# Patient Record
Sex: FEMALE | Race: WHITE | NOT HISPANIC OR LATINO | Employment: UNEMPLOYED | URBAN - METROPOLITAN AREA
[De-identification: names, ages, dates, MRNs, and addresses within clinical notes are randomized per-mention and may not be internally consistent; named-entity substitution may affect disease eponyms.]

---

## 2017-02-21 ENCOUNTER — LAB (OUTPATIENT)
Dept: LAB | Facility: HOSPITAL | Age: 37
End: 2017-02-21

## 2017-02-21 ENCOUNTER — OFFICE VISIT (OUTPATIENT)
Dept: ONCOLOGY | Facility: CLINIC | Age: 37
End: 2017-02-21

## 2017-02-21 VITALS
RESPIRATION RATE: 16 BRPM | SYSTOLIC BLOOD PRESSURE: 142 MMHG | HEART RATE: 79 BPM | WEIGHT: 263 LBS | DIASTOLIC BLOOD PRESSURE: 101 MMHG | HEIGHT: 66 IN | BODY MASS INDEX: 42.27 KG/M2 | TEMPERATURE: 98.4 F

## 2017-02-21 DIAGNOSIS — D50.9 IRON DEFICIENCY ANEMIA: ICD-10-CM

## 2017-02-21 DIAGNOSIS — D50.9 IRON DEFICIENCY ANEMIA, UNSPECIFIED IRON DEFICIENCY ANEMIA TYPE: Primary | ICD-10-CM

## 2017-02-21 LAB
ERYTHROCYTE [DISTWIDTH] IN BLOOD BY AUTOMATED COUNT: 13.7 % (ref 11.3–14.5)
FERRITIN SERPL-MCNC: 6 NG/ML (ref 10–291)
HCT VFR BLD AUTO: 35.4 % (ref 34.5–44)
HGB BLD-MCNC: 11.5 G/DL (ref 11.5–15.5)
IRON 24H UR-MRATE: 47 MCG/DL (ref 50–175)
IRON SATN MFR SERPL: 12 % (ref 15–50)
LYMPHOCYTES # BLD AUTO: 1.7 10*3/MM3 (ref 0.6–4.8)
LYMPHOCYTES NFR BLD AUTO: 26.1 % (ref 24–44)
MCH RBC QN AUTO: 28.5 PG (ref 27–31)
MCHC RBC AUTO-ENTMCNC: 32.5 G/DL (ref 32–36)
MCV RBC AUTO: 87.6 FL (ref 80–99)
MONOCYTES # BLD AUTO: 0.4 10*3/MM3 (ref 0–1)
MONOCYTES NFR BLD AUTO: 6.3 % (ref 0–12)
NEUTROPHILS # BLD AUTO: 4.3 10*3/MM3 (ref 1.5–8.3)
NEUTROPHILS NFR BLD AUTO: 67.6 % (ref 41–71)
PLATELET # BLD AUTO: 297 10*3/MM3 (ref 150–450)
PMV BLD AUTO: 7.9 FL (ref 6–12)
RBC # BLD AUTO: 4.04 10*6/MM3 (ref 3.89–5.14)
TIBC SERPL-MCNC: 405 MCG/DL (ref 250–450)
WBC NRBC COR # BLD: 6.4 10*3/MM3 (ref 3.5–10.8)

## 2017-02-21 PROCEDURE — 85025 COMPLETE CBC W/AUTO DIFF WBC: CPT

## 2017-02-21 PROCEDURE — 82728 ASSAY OF FERRITIN: CPT | Performed by: INTERNAL MEDICINE

## 2017-02-21 PROCEDURE — 99213 OFFICE O/P EST LOW 20 MIN: CPT | Performed by: INTERNAL MEDICINE

## 2017-02-21 PROCEDURE — 83540 ASSAY OF IRON: CPT | Performed by: INTERNAL MEDICINE

## 2017-02-21 PROCEDURE — 83550 IRON BINDING TEST: CPT | Performed by: INTERNAL MEDICINE

## 2017-02-21 PROCEDURE — 36415 COLL VENOUS BLD VENIPUNCTURE: CPT

## 2017-02-21 NOTE — PROGRESS NOTES
"      PROBLEM LIST:  1. Iron deficiency anemia:   a) The patient has a several year history of menorrhagia. In 2014, she had a  colonoscopy and upper endoscopy which were unremarkable. She has required blood  transfusion on 2 separate occasions, in 04/2014 and 12/2015.   b) Feraheme infusion given in 02/2016, 6/2016, 8/2016.  2. Vitamin D deficiency.     Subjective     HISTORY OF PRESENT ILLNESS:   Anne Marie Nguyen returns for follow-up.   Since she was last here she has been feeling well.  Her menstrual bleeding has been regular - about 4 days of moderate bleeding.  Energy is good.  She has not been taking any iron supplements.    Past Medical History, Past Surgical History, Social History, Family History have been reviewed and are without significant changes except as mentioned.    Review of Systems   A comprehensive 14 point review of systems was performed and was negative except as mentioned.    Medications:  The current medication list was reviewed in the EMR    ALLERGIES:  No Known Allergies    Objective      Visit Vitals   • BP (!) 142/101   • Pulse 79   • Temp 98.4 °F (36.9 °C)   • Resp 16   • Ht 66\" (167.6 cm)   • Wt 263 lb (119 kg)   • BMI 42.45 kg/m2        Performance Status: 0    General: well appearing, in no acute distress  HEENT: sclera anicteric, oropharynx clear  Extremeties: no lower extremity edema  Skin: no rashes, lesions, bruising, or petechiae    RECENT LABS:  hgb is 11.5, stable from 11.1 in august. Iron studies pending.        Assessment/Plan   Anne Marie Nguyen is a 36-year-old female with iron deficiency anemia secondary to menorrhagia who is now s/p myomectomy with a significant decrease in her menstrual bleeding.  Her hemoglobin is stable at the lower end of the normal range after 6 months without any iron supplementation.   If her iron studies remain in the normal range today I do not think she will need further hematology follow up.  If her iron levels are declining, she may benefit from " once daily oral iron supplementation.  We will contact her by phone or by letter with the results of her iron studies.                Visit time was 15 minutes, greater than 50% spent in counseling      Kenya Quach MD  [facility]    2/21/2017          CC:

## 2017-02-23 ENCOUNTER — TELEPHONE (OUTPATIENT)
Dept: ONCOLOGY | Facility: CLINIC | Age: 37
End: 2017-02-23

## 2017-02-23 DIAGNOSIS — D50.9 IRON DEFICIENCY ANEMIA, UNSPECIFIED IRON DEFICIENCY ANEMIA TYPE: ICD-10-CM

## 2017-02-23 DIAGNOSIS — IMO0001 ADVERSE EFFECT OF IRON OR ITS COMPOUND, INITIAL ENCOUNTER: Primary | ICD-10-CM

## 2017-04-17 ENCOUNTER — OFFICE VISIT (OUTPATIENT)
Dept: INTERNAL MEDICINE | Facility: CLINIC | Age: 37
End: 2017-04-17

## 2017-04-17 VITALS
RESPIRATION RATE: 16 BRPM | BODY MASS INDEX: 43.09 KG/M2 | DIASTOLIC BLOOD PRESSURE: 92 MMHG | WEIGHT: 267 LBS | HEART RATE: 80 BPM | SYSTOLIC BLOOD PRESSURE: 132 MMHG

## 2017-04-17 DIAGNOSIS — Z00.00 HEALTH CARE MAINTENANCE: Primary | ICD-10-CM

## 2017-04-17 PROCEDURE — 86580 TB INTRADERMAL TEST: CPT | Performed by: INTERNAL MEDICINE

## 2017-04-17 PROCEDURE — 36415 COLL VENOUS BLD VENIPUNCTURE: CPT | Performed by: INTERNAL MEDICINE

## 2017-04-17 PROCEDURE — 90471 IMMUNIZATION ADMIN: CPT | Performed by: INTERNAL MEDICINE

## 2017-04-17 PROCEDURE — 99213 OFFICE O/P EST LOW 20 MIN: CPT | Performed by: INTERNAL MEDICINE

## 2017-04-17 PROCEDURE — 86481 TB AG RESPONSE T-CELL SUSP: CPT | Performed by: INTERNAL MEDICINE

## 2017-04-17 PROCEDURE — 90746 HEPB VACCINE 3 DOSE ADULT IM: CPT | Performed by: INTERNAL MEDICINE

## 2017-04-17 NOTE — PROGRESS NOTES
Subjective   Anne Marie Nguyen is a 37 y.o. female.   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             History of Present Illness   Pt has a history of iron deficiency anemia and Vit D deficiency. She is doing well with chronic issues. 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 She has no acute complaints.   - She needs titers for measles, mumps, rubella and varicella   - Needs Hep B vaccine   - Needs PPD   - She has had Tdap vaccine       The following portions of the patient's history were reviewed and updated as appropriate: allergies, current medications, past family history, past medical history, past social history, past surgical history and problem list.             Review of Systems   Constitutional: Negative.    HENT: Negative.    Eyes: Negative.    Respiratory: Negative.    Cardiovascular: Negative.    Gastrointestinal: Negative.    Endocrine: Negative.    Genitourinary: Negative.    Musculoskeletal: Negative.    Skin: Negative.    Allergic/Immunologic: Negative.    Neurological: Negative.    Hematological: Negative.    Psychiatric/Behavioral: Negative.             Objective   Physical Exam   Constitutional: She is oriented to person, place, and time. She appears well-developed and well-nourished.   HENT:   Head: Normocephalic and atraumatic.   Right Ear: External ear normal.   Left Ear: External ear normal.   Nose: Nose normal.   Mouth/Throat: Oropharynx is clear and moist.   Eyes: Conjunctivae and EOM are normal. Pupils are equal, round, and reactive to light.   Neck: Normal range of motion. Neck supple. No tracheal deviation present. No thyromegaly present.   Cardiovascular: Normal rate, regular rhythm, normal heart sounds and intact distal pulses.    Pulmonary/Chest: Effort normal and breath sounds normal.   Abdominal: Soft. Bowel sounds are normal. She exhibits no distension. There is no tenderness.   Neurological: She is alert and oriented to  person, place, and time.   Skin: Skin is warm and dry.   Psychiatric: She has a normal mood and affect.   Nursing note and vitals reviewed.            Assessment/Plan    Health care maintenance  -     Hepatitis B Vaccine Adult IM  -     Measles / Mumps / Rubella Immunity  -     Varicella Zoster Antibody, IgG  -     Varicella Zoster Antibody, IgM  -     TSPOT  -     TB Skin Test            1.) Order titers for measles, mumps,rubella,varicella  2.) Hep B received first vaccine today   3.) PPD

## 2017-04-18 LAB
MEV IGG SER IA-ACNC: <25 AU/ML
MUV IGG SER IA-ACNC: <9 AU/ML
RUBV IGG SERPL IA-ACNC: <0.9 INDEX
VZV IGG SER IA-ACNC: 508 INDEX
VZV IGM SER IA-ACNC: <0.91 INDEX (ref 0–0.9)

## 2017-04-20 LAB — REF LAB TEST METHOD: NORMAL

## 2017-04-21 ENCOUNTER — TELEPHONE (OUTPATIENT)
Dept: INTERNAL MEDICINE | Facility: CLINIC | Age: 37
End: 2017-04-21

## 2017-04-21 NOTE — TELEPHONE ENCOUNTER
Pt has been informed she would have to have it read here or the urgent care. Pt says she will call when she gets back in town to make another appt to have it redone.

## 2017-04-21 NOTE — TELEPHONE ENCOUNTER
----- Message from Melissa Kruger sent at 4/21/2017 10:55 AM EDT -----  Pt CALLING TO STATE THAT SHE HAD HER TB SKIN TEST READ BY PROFESSIONAL WITHIN ALLOTTED TIME FRAME. WANTS TO KNOW IF  ACCEPTABLE OR NEEDS TO RETAKE.      CONTACT [phone]

## 2017-04-21 NOTE — TELEPHONE ENCOUNTER
If the TB Skin test was given here, just she have to have it read by a [facility] facility in order to record the results or can we take her word for it?

## 2017-05-02 ENCOUNTER — TELEPHONE (OUTPATIENT)
Dept: INTERNAL MEDICINE | Facility: CLINIC | Age: 37
End: 2017-05-02

## 2017-05-08 ENCOUNTER — CLINICAL SUPPORT (OUTPATIENT)
Dept: INTERNAL MEDICINE | Facility: CLINIC | Age: 37
End: 2017-05-08

## 2017-05-08 DIAGNOSIS — Z23 NEED FOR MMR VACCINE: Primary | ICD-10-CM

## 2017-05-08 PROCEDURE — 90707 MMR VACCINE SC: CPT | Performed by: INTERNAL MEDICINE

## 2017-05-08 PROCEDURE — 90471 IMMUNIZATION ADMIN: CPT | Performed by: INTERNAL MEDICINE

## 2017-05-17 ENCOUNTER — CLINICAL SUPPORT (OUTPATIENT)
Dept: INTERNAL MEDICINE | Facility: CLINIC | Age: 37
End: 2017-05-17

## 2017-05-17 DIAGNOSIS — Z11.1 SCREENING EXAMINATION FOR PULMONARY TUBERCULOSIS: ICD-10-CM

## 2017-05-17 DIAGNOSIS — Z23 IMMUNIZATION DUE: Primary | ICD-10-CM

## 2017-05-17 PROCEDURE — 90471 IMMUNIZATION ADMIN: CPT | Performed by: INTERNAL MEDICINE

## 2017-05-17 PROCEDURE — 90744 HEPB VACC 3 DOSE PED/ADOL IM: CPT | Performed by: INTERNAL MEDICINE

## 2017-06-12 ENCOUNTER — CLINICAL SUPPORT (OUTPATIENT)
Dept: INTERNAL MEDICINE | Facility: CLINIC | Age: 37
End: 2017-06-12

## 2017-06-12 DIAGNOSIS — Z00.00 HEALTH CARE MAINTENANCE: Primary | ICD-10-CM

## 2017-06-12 PROCEDURE — 90707 MMR VACCINE SC: CPT | Performed by: INTERNAL MEDICINE

## 2017-06-12 PROCEDURE — 90471 IMMUNIZATION ADMIN: CPT | Performed by: INTERNAL MEDICINE

## 2017-06-12 PROCEDURE — 86580 TB INTRADERMAL TEST: CPT | Performed by: INTERNAL MEDICINE

## 2017-06-15 LAB
INDURATION: 0 MM (ref 0–10)
TB SKIN TEST: NEGATIVE

## 2017-07-06 ENCOUNTER — TELEPHONE (OUTPATIENT)
Dept: INTERNAL MEDICINE | Facility: CLINIC | Age: 37
End: 2017-07-06

## 2017-07-06 NOTE — TELEPHONE ENCOUNTER
I have no clue what the below message means. If there is a form I need to fill out, if someone would put it in my in box I will fill it out.

## 2017-07-06 NOTE — TELEPHONE ENCOUNTER
Regarding: RE: Test Results Question  Contact: [phone]  ----- Message from Raeann Quijano MA sent at 7/6/2017  1:50 PM EDT -----       ----- Message from Anne Marie Nguyen to Aracelis Jennings DO sent at 7/6/2017  1:02 PM -----   Yes, just the documentation of when the skin test was given and read and then signed by the physician. I have the form and have filled that part out.    Thank you,    Anne Marie  ----- Message -----  From: JONE DELGADO  Sent: 6/22/2017  2:23 PM EDT  To: Anne Marie Nguyen  Subject: RE: Test Results Question    WHAT FORM IS NEEDING TO BE FILLED OUT?   OR DO YOU JUST NEED THE DOCUMENTATION OF THE 2 TB SKIN TESTS THAT WERE PERFORMED?    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 I recently had a TB Skin test completed, but the paperwork provide was not correct. Can a form be filled out with the following information and faxed to [phone] or a I can pick it up.    Please resubmit with your Annual Risk Assessment form available for download from this requirement along with a Negative 2 step TB screening from the past 12 months Documentation must include the date given, the date read, and the reading in millimeters. It must also be signed by the clinician who performed and read the test. Negative IGRA (interferon gamma release assay) blood test from the past 12 months.    Thank you,    Anne Marie

## 2017-07-07 NOTE — TELEPHONE ENCOUNTER
LVM for pt to call back. Just need to inform her that her documentation is printed, up front , and ready for pickup. cw

## 2017-07-13 NOTE — TELEPHONE ENCOUNTER
LVM informing pt that paperwork was up front for . Advised if any questions or would like it mailed to call our office and let us know. Provided office number and hours.

## 2017-08-08 ENCOUNTER — TELEPHONE (OUTPATIENT)
Dept: INTERNAL MEDICINE | Facility: CLINIC | Age: 37
End: 2017-08-08

## 2017-08-08 DIAGNOSIS — Z11.1 TUBERCULOSIS SCREENING: Primary | ICD-10-CM

## 2017-08-08 NOTE — TELEPHONE ENCOUNTER
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  Dr. Jennings has put this in for her before.

## 2017-08-09 ENCOUNTER — LAB (OUTPATIENT)
Dept: INTERNAL MEDICINE | Facility: CLINIC | Age: 37
End: 2017-08-09

## 2017-08-09 DIAGNOSIS — Z11.1 TUBERCULOSIS SCREENING: ICD-10-CM

## 2017-08-09 PROCEDURE — 86481 TB AG RESPONSE T-CELL SUSP: CPT | Performed by: INTERNAL MEDICINE

## 2017-08-09 PROCEDURE — 36415 COLL VENOUS BLD VENIPUNCTURE: CPT | Performed by: INTERNAL MEDICINE

## 2017-08-14 LAB — REF LAB TEST METHOD: NORMAL

## 2017-10-17 ENCOUNTER — TELEPHONE (OUTPATIENT)
Dept: INTERNAL MEDICINE | Facility: CLINIC | Age: 37
End: 2017-10-17

## 2017-10-17 NOTE — TELEPHONE ENCOUNTER
----- Message from Kelsey العلي sent at 10/17/2017  3:13 PM EDT -----  Contact: SELF  NAHUM TYSON HAS AN APPT ON 10/19/17 AT 2:00 FOR HER 3RD HEP B VACCINE.

## 2017-10-19 ENCOUNTER — CLINICAL SUPPORT (OUTPATIENT)
Dept: INTERNAL MEDICINE | Facility: CLINIC | Age: 37
End: 2017-10-19

## 2017-10-19 DIAGNOSIS — Z23 NEED FOR HEPATITIS B VACCINATION: Primary | ICD-10-CM

## 2017-10-19 PROCEDURE — 90471 IMMUNIZATION ADMIN: CPT | Performed by: INTERNAL MEDICINE

## 2017-10-19 PROCEDURE — 90746 HEPB VACCINE 3 DOSE ADULT IM: CPT | Performed by: INTERNAL MEDICINE

## 2019-02-11 ENCOUNTER — OFFICE VISIT (OUTPATIENT)
Dept: INTERNAL MEDICINE | Facility: CLINIC | Age: 39
End: 2019-02-11

## 2019-02-11 VITALS
SYSTOLIC BLOOD PRESSURE: 142 MMHG | BODY MASS INDEX: 45.48 KG/M2 | WEIGHT: 273 LBS | HEIGHT: 65 IN | RESPIRATION RATE: 18 BRPM | TEMPERATURE: 98.8 F | HEART RATE: 84 BPM | DIASTOLIC BLOOD PRESSURE: 108 MMHG

## 2019-02-11 DIAGNOSIS — D64.9 ANEMIA, UNSPECIFIED TYPE: ICD-10-CM

## 2019-02-11 DIAGNOSIS — R32 URINARY INCONTINENCE, UNSPECIFIED TYPE: ICD-10-CM

## 2019-02-11 DIAGNOSIS — K21.9 GASTROESOPHAGEAL REFLUX DISEASE WITHOUT ESOPHAGITIS: ICD-10-CM

## 2019-02-11 DIAGNOSIS — Z12.31 ENCOUNTER FOR SCREENING MAMMOGRAM FOR BREAST CANCER: ICD-10-CM

## 2019-02-11 DIAGNOSIS — I10 ESSENTIAL HYPERTENSION: ICD-10-CM

## 2019-02-11 DIAGNOSIS — M54.42 ACUTE LEFT-SIDED LOW BACK PAIN WITH LEFT-SIDED SCIATICA: ICD-10-CM

## 2019-02-11 DIAGNOSIS — Z01.419 GYNECOLOGIC EXAM NORMAL: ICD-10-CM

## 2019-02-11 DIAGNOSIS — Z00.00 PHYSICAL EXAM: Primary | ICD-10-CM

## 2019-02-11 LAB
25(OH)D3 SERPL-MCNC: 18 NG/ML
ALBUMIN SERPL-MCNC: 4.42 G/DL (ref 3.2–4.8)
ALBUMIN/GLOB SERPL: 1.9 G/DL (ref 1.5–2.5)
ALP SERPL-CCNC: 72 U/L (ref 25–100)
ALT SERPL W P-5'-P-CCNC: 17 U/L (ref 7–40)
ANION GAP SERPL CALCULATED.3IONS-SCNC: 8 MMOL/L (ref 3–11)
ARTICHOKE IGE QN: 142 MG/DL (ref 0–130)
AST SERPL-CCNC: 27 U/L (ref 0–33)
BASOPHILS # BLD AUTO: 0.03 10*3/MM3 (ref 0–0.2)
BASOPHILS NFR BLD AUTO: 0.5 % (ref 0–1)
BILIRUB BLD-MCNC: NEGATIVE MG/DL
BILIRUB SERPL-MCNC: 0.4 MG/DL (ref 0.3–1.2)
BUN BLD-MCNC: 14 MG/DL (ref 9–23)
BUN/CREAT SERPL: 22.6 (ref 7–25)
CALCIUM SPEC-SCNC: 8.8 MG/DL (ref 8.7–10.4)
CHLORIDE SERPL-SCNC: 106 MMOL/L (ref 99–109)
CHOLEST SERPL-MCNC: 199 MG/DL (ref 0–200)
CLARITY, POC: CLEAR
CO2 SERPL-SCNC: 24 MMOL/L (ref 20–31)
COLOR UR: YELLOW
CREAT BLD-MCNC: 0.62 MG/DL (ref 0.6–1.3)
DEPRECATED RDW RBC AUTO: 42.9 FL (ref 37–54)
EOSINOPHIL # BLD AUTO: 0.13 10*3/MM3 (ref 0–0.3)
EOSINOPHIL NFR BLD AUTO: 2 % (ref 0–3)
ERYTHROCYTE [DISTWIDTH] IN BLOOD BY AUTOMATED COUNT: 13.1 % (ref 11.3–14.5)
EXPIRATION DATE: NORMAL
FERRITIN SERPL-MCNC: 9 NG/ML (ref 10–291)
GFR SERPL CREATININE-BSD FRML MDRD: 106 ML/MIN/1.73
GLOBULIN UR ELPH-MCNC: 2.4 GM/DL
GLUCOSE BLD-MCNC: 94 MG/DL (ref 70–100)
GLUCOSE UR STRIP-MCNC: NEGATIVE MG/DL
HCT VFR BLD AUTO: 38.6 % (ref 34.5–44)
HDLC SERPL-MCNC: 45 MG/DL (ref 40–60)
HGB BLD-MCNC: 12.2 G/DL (ref 11.5–15.5)
IMM GRANULOCYTES # BLD AUTO: 0.01 10*3/MM3 (ref 0–0.05)
IMM GRANULOCYTES NFR BLD AUTO: 0.2 % (ref 0–0.6)
IRON 24H UR-MRATE: 69 MCG/DL (ref 50–175)
IRON SATN MFR SERPL: 17 % (ref 15–50)
KETONES UR QL: NEGATIVE
LEUKOCYTE EST, POC: NEGATIVE
LYMPHOCYTES # BLD AUTO: 1.95 10*3/MM3 (ref 0.6–4.8)
LYMPHOCYTES NFR BLD AUTO: 29.6 % (ref 24–44)
Lab: NORMAL
MCH RBC QN AUTO: 28.8 PG (ref 27–31)
MCHC RBC AUTO-ENTMCNC: 31.6 G/DL (ref 32–36)
MCV RBC AUTO: 91 FL (ref 80–99)
MONOCYTES # BLD AUTO: 0.54 10*3/MM3 (ref 0–1)
MONOCYTES NFR BLD AUTO: 8.2 % (ref 0–12)
NEUTROPHILS # BLD AUTO: 3.94 10*3/MM3 (ref 1.5–8.3)
NEUTROPHILS NFR BLD AUTO: 59.7 % (ref 41–71)
NITRITE UR-MCNC: NEGATIVE MG/ML
PH UR: 6 [PH] (ref 5–8)
PLATELET # BLD AUTO: 332 10*3/MM3 (ref 150–450)
PMV BLD AUTO: 11.5 FL (ref 6–12)
POTASSIUM BLD-SCNC: 4.1 MMOL/L (ref 3.5–5.5)
PROT SERPL-MCNC: 6.8 G/DL (ref 5.7–8.2)
PROT UR STRIP-MCNC: NEGATIVE MG/DL
RBC # BLD AUTO: 4.24 10*6/MM3 (ref 3.89–5.14)
RBC # UR STRIP: NEGATIVE /UL
SODIUM BLD-SCNC: 138 MMOL/L (ref 132–146)
SP GR UR: 1.01 (ref 1–1.03)
TIBC SERPL-MCNC: 399 MCG/DL (ref 250–450)
TRIGL SERPL-MCNC: 105 MG/DL (ref 0–150)
TSH SERPL DL<=0.05 MIU/L-ACNC: 0.97 MIU/ML (ref 0.35–5.35)
UROBILINOGEN UR QL: NORMAL
WBC NRBC COR # BLD: 6.59 10*3/MM3 (ref 3.5–10.8)

## 2019-02-11 PROCEDURE — 83550 IRON BINDING TEST: CPT | Performed by: INTERNAL MEDICINE

## 2019-02-11 PROCEDURE — 83540 ASSAY OF IRON: CPT | Performed by: INTERNAL MEDICINE

## 2019-02-11 PROCEDURE — 82306 VITAMIN D 25 HYDROXY: CPT | Performed by: INTERNAL MEDICINE

## 2019-02-11 PROCEDURE — 99386 PREV VISIT NEW AGE 40-64: CPT | Performed by: INTERNAL MEDICINE

## 2019-02-11 PROCEDURE — 80061 LIPID PANEL: CPT | Performed by: INTERNAL MEDICINE

## 2019-02-11 PROCEDURE — 36415 COLL VENOUS BLD VENIPUNCTURE: CPT | Performed by: INTERNAL MEDICINE

## 2019-02-11 PROCEDURE — 99213 OFFICE O/P EST LOW 20 MIN: CPT | Performed by: INTERNAL MEDICINE

## 2019-02-11 PROCEDURE — 81003 URINALYSIS AUTO W/O SCOPE: CPT | Performed by: INTERNAL MEDICINE

## 2019-02-11 PROCEDURE — 85025 COMPLETE CBC W/AUTO DIFF WBC: CPT | Performed by: INTERNAL MEDICINE

## 2019-02-11 PROCEDURE — 82728 ASSAY OF FERRITIN: CPT | Performed by: INTERNAL MEDICINE

## 2019-02-11 PROCEDURE — 84443 ASSAY THYROID STIM HORMONE: CPT | Performed by: INTERNAL MEDICINE

## 2019-02-11 PROCEDURE — 80053 COMPREHEN METABOLIC PANEL: CPT | Performed by: INTERNAL MEDICINE

## 2019-02-11 RX ORDER — OMEPRAZOLE 40 MG/1
40 CAPSULE, DELAYED RELEASE ORAL DAILY
COMMUNITY
End: 2019-10-28

## 2019-02-11 RX ORDER — CYCLOBENZAPRINE HCL 10 MG
10 TABLET ORAL 3 TIMES DAILY PRN
Qty: 30 TABLET | Refills: 1 | Status: SHIPPED | OUTPATIENT
Start: 2019-02-11 | End: 2019-08-01 | Stop reason: SDUPTHER

## 2019-02-11 NOTE — PROGRESS NOTES
Chief Complaint   Patient presents with   • NEW PATIENT, ESTABLISH CARE   • LOW BACK AND LEFT HIP PAIN       History of Present Illness    The patient presents for an established patient physical examination and health maintenance visit.    The patient presents for a yearly gynecologic exam. She is G 0 P 0 Ab 0 SpAb 0 LC 0. Her menses are regular. She is sexually active. She has never had a sexually transmitted disease. She denies vaginal discharge and she denies dyspareunia, dysmenorrhea or menorrhagia. The patient has not had prior abnormal pap smears. The most recent pap smear was in 2016 and revealed no abnormalities. There has not been a past history of HPV or condylomata.       She has not had a prior mammogram.    The patient presents for a follow-up related to anemia. There are no reports of blood loss. The patient has no symptoms of a dry cough, a wet cough, wheezing, fever, a headache, myalgias, sweats, weight loss, decreased appetite, chills, fatigue or paresthesias. The patient's energy level is normal. There are no reports of chest pain, shortness of breath, palpitations or syncope. She had a fibroid removed and her menses are now regular and lighter.    The patient presents for an acute visit for hypertension. The patient reports that she has had no edema or blurred vision. She states that she does not take medication.    The patient presents for a follow-up related to GERD. The patient is on omeprazole for her gastroesophageal reflux. The medication is taken on a regular basis and gives complete relief of the symptoms. She reports no belching, dysphagia, early satiety, hoarseness or odynophagia. The GERD has no known aggravating factors.    The patient presents for a follow-up related to low back pain which began ten days ago. She states that the pain was first noted to come on insidiously. The pain is not associated with rashes or a decreased urine stream. The patient denies a past history of  malignancy. The pain radiates to the left hip. There is no numbness. The patient denies loss of bladder control. The patient denies loss of bowel control. The patient has attempted no therapy to relieve the symptoms. The patient reports bending aggravates the symptoms.    The patient complains of urinary incontinence of a duration of longer than one year. The urine stream is normal. The patient denies increased nocturia. She states that she dribbles and she denies a sensation of incomplete emptying of the bladder. The urinary incontinence has no know precipitants. The patient denies urgency. She denies a history of a history of back injuries or back pain. She does not have a history of cerebrovascular accidents, dementia, diabetes or chronic UTI's. The patient is not on anticholinergics, antidepressants, antipsychotics, diuretics, hypontics, opiates, or ace inhibitors. She denies excessive caffeine intake and drinks minimal alcohol.    Review of Systems    CONSTITUTIONAL- Denies Weakness or Malaise.    NECK- Denies Decreased Range of Motion, Stiffness, Thyroid Nodules, Enlarged Lymph Nodes or Goiter.    EYES- Denies Eye Pain, Eye Drainage, Eye Redness, Eye Discharge, Decreased Vision, Visual Disturbance, Diplopia, Visual Loss or Swollen Eyelid.    HENT- Denies Nasal Discharge, Sore Throat, Ear Pain, Ear Drainage, Sinus Pain, Nasal Congestion, Decreased Hearing or Tinnitus.    GASTROINTESTINAL- Denies Abdominal Pain, Nausea, Vomiting, Diarrhea, Blood per Rectum, Constipation or Heartburn.    PULMONARY- Denies Sputum Production, Cough, Hemoptysis or Pleuritic Chest Pain.    CARDIOVASCULAR- Denies Claudication or Irregular Heart Beat.    GENITOURINARY- Reports: Urinary Leakage. Denies: Dysuria, Hematuria, Urinary Frequency, Pelvic Pain or Vaginal Prolapse.    ENDOCRINE- Denies Cold Intolerance, Depression, Hair Loss, Heat Intolerance, Memory Loss, Polydypsia, Polyphagia, Polyuria, Sleep Disturbance or Weight  "Gain.    NEUROLOGIC- Denies Seizures, Confusion or Excessive Daytime Sleepiness.    MUSCULOSKELETAL- Denies Joint Pain, Joint Stiffness, Decreased Range of Motion, Joint Swelling or Erythema of Joints.    INTEGUMENTARY- Denies Lumps, Sores, Itching, Dryness, Color Change, Changes in Hair, Brittle Nails, Discolored Nails, Thickened Nails, Growths or Alopecia.    Medications      Current Outpatient Medications:   •  MULTIPLE VITAMIN PO, Take  by mouth daily., Disp: , Rfl:   •  omeprazole (priLOSEC) 40 MG capsule, Take 40 mg by mouth Daily., Disp: , Rfl:   •  cyclobenzaprine (FLEXERIL) 10 MG tablet, Take 1 tablet by mouth 3 (Three) Times a Day As Needed for Muscle Spasms., Disp: 30 tablet, Rfl: 1     Allergies    No Known Allergies    Problem List    Patient Active Problem List   Diagnosis   • Iron deficiency anemia   • Adverse effect of iron or its compound   • Vitamin D deficiency   • Health care maintenance       Medications, Allergies, Problems List and Past History were reviewed and updated.    Physical Examination    BP (!) 142/108 (BP Location: Left arm, Patient Position: Sitting, Cuff Size: Adult)   Pulse 84   Temp 98.8 °F (37.1 °C) (Temporal)   Resp 18   Ht 163.8 cm (64.5\")   Wt 124 kg (273 lb)   LMP 01/16/2019 (Exact Date)   Breastfeeding? No   BMI 46.14 kg/m²     HEENT: Head- Normocephalic Atraumatic. Facies- Within normal limits. Pinnas- Normal texture and shape bilaterally. Canals- Normal bilaterally. TMs- Normal bilaterally. Nares- Patent bilaterally. Nasal Septum- is normal. There is no tenderness to palpation over the frontal or maxillary sinuses. Lids- Normal bilaterally. Conjunctiva- Clear bilaterally. Sclera- Anicteric bilaterally. Oropharynx- Moist with no lesions. Tonsils- No enlargement, erythema or exudate.    Neck: Thyroid- non enlarged, symmetric and has no nodules. No bruits are detected. ROM- Normal Range of Motion with no rigidity.    Lungs: Auscultation- Clear to auscultation " bilaterally. There are no retractions, clubbing or cyanosis. The Expiratory to Inspiratory ratio is equal.    Lymph Nodes: Cervical- no enlarged lymph nodes noted. Clavicular- no enlarged supraclavicular lymph nodes noted. Axillary- no enlarged axillary lymph nodes noted. Inguinal- no enlarged inguinal lymph nodes noted.    Cardiovascular: There are no carotid bruits. Heart- Normal Rate with Regular rhythm and no murmurs. There are no gallops. There are no rubs. In the lower extremities there is no edema. The upper extremities do not have edema.    Abdomen: Soft, benign, non-tender with no masses, hernias, organomegaly or scars.    Breast: Normal contours bilaterally with no masses, discharge, skin changes or lumps. There are no scars noted.      Genitourinary: The labia are within normal limits with no lesions. The vaginal introitus is within normal limits. The vagina has a healthy mucosa with no lesions or discharge. The cervix is multiparous with no lesions. The bimanual examination reveals no adnexal masses or tenderness.    Back: The patient has a normal spinal curvature with no evidence of scoliosis. There are no skin lesions noted on the back. Palpation reveals no tenderness and normal muscle tone. The straight leg raising test is negative. The back has normal flexion, extension, rotation, and lateral bending.    Lower Extremity Neuro Exam: Muscle Strength is 5/5 in all major lower extremity muscle groups. Deep Tendon Reflexes are 2+ and equal in the patellar and Achilles distributions bilaterally. Sensation is normal in all distributions.    Hips: The left hip has normal sudheer landmarks and no noted atrophy. Left hip flexion is normal. Left hip extension is normal. Left hip abduction is normal. There is no pain with forced abduction. Left hip adduction is normal. Left hip internal rotation is painful and restricted. Left hip external rotation is painful and restricted. There is no pain with straight leg  raising on the left.    Dermatologic: The patient has no worrisome or suspicious skin lesions noted.    Impression and Assessment    Normal Physical Examination.    Normal Gynecologic Exam.    Encounter for Screening Mammogram for Malignant Neoplasm of the Breast.    Anemia.    Essential Hypertension.    Gastroesophageal Reflux Disease.    Low Back Pain.    Urinary Incontinence.    Plan    Gastroesophageal Reflux Disease Plan: The current plan was continued.    Essential Hypertension Plan: Instructions were given to eat a low salt diet. She was encouraged to exercise daily. Weight loss was encouraged. Recheck in one month.    Urinary Incontinence Plan: Kegel exercises were explained and encouraged.    Low Back Pain Plan: Medication will be added as noted.  Physical Therapy was scheduled.    Normal Gynecologic Exam Plan: A Pap smear was obtained.    Anemia Plan: Further plans will be made after results of tests are available.    Counseling was provided regarding: Adequate Aerobic Exercise, Dental Visits, Flossing Teeth, Heart Healthy Diet and Weight Lose.    The following was ordered for screening and health maintenance: Lipid Profile, Screening Mammogram, TSH and Vitamin D Level.       Immunizations Ordered and Administered: None.    Anne Marie was seen today for new patient, establish care and low back and left hip pain.    Diagnoses and all orders for this visit:    Physical exam  -     Lipid Panel  -     TSH  -     Vitamin D 25 Hydroxy    Gynecologic exam normal  -     Liquid-based Pap Smear, Screening; Future    Anemia, unspecified type  -     Comprehensive Metabolic Panel  -     CBC & Differential  -     Iron Profile  -     Ferritin  -     CBC Auto Differential    Essential hypertension  -     Comprehensive Metabolic Panel  -     POC Urinalysis Dipstick, Automated    Gastroesophageal reflux disease without esophagitis  -     Comprehensive Metabolic Panel    Acute left-sided low back pain with left-sided sciatica  -      Ambulatory Referral to Physical Therapy Evaluate and treat  -     cyclobenzaprine (FLEXERIL) 10 MG tablet; Take 1 tablet by mouth 3 (Three) Times a Day As Needed for Muscle Spasms.    Urinary incontinence, unspecified type  -     POC Urinalysis Dipstick, Automated    Encounter for screening mammogram for breast cancer  -     Mammo Screening Digital Tomosynthesis Bilateral With CAD; Future        Return to Office    The patient was instructed to return for follow-up in 1 month. Next Visit: Follow-up.    The patient was instructed to return sooner if the condition changes, worsens, or does not resolve.

## 2019-03-20 ENCOUNTER — OFFICE VISIT (OUTPATIENT)
Dept: INTERNAL MEDICINE | Facility: CLINIC | Age: 39
End: 2019-03-20

## 2019-03-20 VITALS
SYSTOLIC BLOOD PRESSURE: 126 MMHG | BODY MASS INDEX: 45.97 KG/M2 | TEMPERATURE: 98.9 F | WEIGHT: 272 LBS | HEART RATE: 100 BPM | RESPIRATION RATE: 18 BRPM | DIASTOLIC BLOOD PRESSURE: 78 MMHG

## 2019-03-20 DIAGNOSIS — M54.50 ACUTE MIDLINE LOW BACK PAIN WITHOUT SCIATICA: ICD-10-CM

## 2019-03-20 DIAGNOSIS — I10 ESSENTIAL HYPERTENSION: Primary | ICD-10-CM

## 2019-03-20 PROCEDURE — 99213 OFFICE O/P EST LOW 20 MIN: CPT | Performed by: INTERNAL MEDICINE

## 2019-03-20 NOTE — PROGRESS NOTES
Chief Complaint   Patient presents with   • Follow-up     1 MONTH FOLLOW UP        History of Present Illness    The patient presents for a follow-up related to hypertension. The patient reports that she has had no headaches, chest pain, dyspnea, edema, syncope, blurred vision or palpitations. She states that she is taking her medication as prescribed. She is not having medication side effects.    The patient presents for follow-up of low back pain. She reports that the pain is resolved. The treatment consisted of physical therapy.   She does not have associated fever, chills, weight loss, rashes, dysuria, hematuria, or a decreased urine stream.    Review of Systems    CONSTITUTIONAL- Denies Unexplained Weight Loss, Fever, Chills, Sweats, Fatigue, Weakness or Malaise.    PULMONARY- Denies Wheezing, Sputum Production, Cough, Hemoptysis or Pleuritic Chest Pain.    CARDIOVASCULAR- Denies Claudication or Irregular Heart Beat.    Medications      Current Outpatient Medications:   •  calcium carb-cholecalciferol (CALCIUM 600+D3) 600-800 MG-UNIT tablet, Take 1 tablet by mouth Daily., Disp: , Rfl:   •  cyclobenzaprine (FLEXERIL) 10 MG tablet, Take 1 tablet by mouth 3 (Three) Times a Day As Needed for Muscle Spasms., Disp: 30 tablet, Rfl: 1  •  omeprazole (priLOSEC) 40 MG capsule, Take 40 mg by mouth Daily., Disp: , Rfl:      Allergies    No Known Allergies    Problem List    Patient Active Problem List   Diagnosis   • Iron deficiency anemia   • Adverse effect of iron or its compound   • Vitamin D deficiency   • Health care maintenance       Medications, Allergies, Problems List and Past History were reviewed and updated.    Physical Examination    /78   Pulse 100   Temp 98.9 °F (37.2 °C) (Temporal)   Resp 18   Wt 123 kg (272 lb)   LMP 03/17/2019 (Exact Date)   Breastfeeding? No   BMI 45.97 kg/m²     Neck: Thyroid- non enlarged, symmetric and has no nodules. No bruits are detected.    Lungs: Auscultation- Clear  to auscultation bilaterally. There are no retractions, clubbing or cyanosis. The Expiratory to Inspiratory ratio is equal.    Cardiovascular: Heart- Normal Rate with Regular rhythm and no murmurs.    Back: The patient has a normal spinal curvature with no evidence of scoliosis. There are no skin lesions noted on the back. Palpation reveals no tenderness and normal muscle tone. The straight leg raising test is negative. The back has normal flexion, extension, rotation, and lateral bending.    Impression and Assessment    Essential Hypertension.    Low Back Pain.    Plan    Essential Hypertension Plan: The condition is stable. No change was made in the current plan.    Low Back Pain Plan: The condition is stable. No change is needed in the current plan.      Return to Office    The patient was instructed to return for follow-up in 6 months.    The patient was instructed to return sooner if the condition changes, worsens, or does not resolve.

## 2019-03-27 ENCOUNTER — HOSPITAL ENCOUNTER (OUTPATIENT)
Dept: MAMMOGRAPHY | Facility: HOSPITAL | Age: 39
Discharge: HOME | End: 2019-03-27
Admitting: INTERNAL MEDICINE

## 2019-03-27 DIAGNOSIS — Z12.31 ENCOUNTER FOR SCREENING MAMMOGRAM FOR BREAST CANCER: ICD-10-CM

## 2019-03-27 PROCEDURE — 77067 SCR MAMMO BI INCL CAD: CPT | Performed by: RADIOLOGY

## 2019-03-27 PROCEDURE — 77067 SCR MAMMO BI INCL CAD: CPT

## 2019-03-27 PROCEDURE — 77063 BREAST TOMOSYNTHESIS BI: CPT

## 2019-03-27 PROCEDURE — 77063 BREAST TOMOSYNTHESIS BI: CPT | Performed by: RADIOLOGY

## 2019-08-01 ENCOUNTER — OFFICE VISIT (OUTPATIENT)
Dept: NEUROSURGERY | Facility: CLINIC | Age: 39
End: 2019-08-01

## 2019-08-01 ENCOUNTER — TELEPHONE (OUTPATIENT)
Dept: NEUROSURGERY | Facility: CLINIC | Age: 39
End: 2019-08-01

## 2019-08-01 VITALS
SYSTOLIC BLOOD PRESSURE: 122 MMHG | TEMPERATURE: 97.9 F | HEIGHT: 65 IN | DIASTOLIC BLOOD PRESSURE: 74 MMHG | WEIGHT: 264 LBS | BODY MASS INDEX: 43.99 KG/M2

## 2019-08-01 DIAGNOSIS — M54.42 ACUTE MIDLINE LOW BACK PAIN WITH LEFT-SIDED SCIATICA: ICD-10-CM

## 2019-08-01 DIAGNOSIS — M54.42 ACUTE LOW BACK PAIN WITH LEFT-SIDED SCIATICA, UNSPECIFIED BACK PAIN LATERALITY: Primary | ICD-10-CM

## 2019-08-01 DIAGNOSIS — M54.42 ACUTE LEFT-SIDED LOW BACK PAIN WITH LEFT-SIDED SCIATICA: ICD-10-CM

## 2019-08-01 PROCEDURE — 99203 OFFICE O/P NEW LOW 30 MIN: CPT | Performed by: PHYSICIAN ASSISTANT

## 2019-08-01 RX ORDER — CYCLOBENZAPRINE HCL 10 MG
10 TABLET ORAL 3 TIMES DAILY PRN
Qty: 60 TABLET | Refills: 1 | Status: SHIPPED | OUTPATIENT
Start: 2019-08-01 | End: 2019-10-04 | Stop reason: SDUPTHER

## 2019-08-01 RX ORDER — NABUMETONE 750 MG/1
750 TABLET, FILM COATED ORAL 2 TIMES DAILY PRN
Qty: 60 TABLET | Refills: 0 | Status: SHIPPED | OUTPATIENT
Start: 2019-08-01 | End: 2019-10-04 | Stop reason: SDUPTHER

## 2019-08-01 NOTE — PROGRESS NOTES
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  She admits to a history of back pain which has waxed and waned for years for which she never sought intervention.  She states that the pain worsened approximately 2 months ago.  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  She states that the pain is currently isolated to the lower back/left posterior hip area.  She states that the pain is associated with numbness which occurs intermittently and extends to her left calf and sometimes foot.  She denies weakness of the leg.  She is ambulatory without issue although has occasional difficulty secondary to pain.  She denies bowel or bladder dysfunction or numbness in the sacral distribution.  She has been through extensive physical therapy since March of this year.  Additionally, she is taken steroids, muscle relaxers, anti-inflammatories, gabapentin.  All of these conservative measures have given her minimal relief.  She has recently started using a TENS unit around her left piriformis region which has given her the most relief.      Past Medical and Surgical History:  Past Medical History:   Diagnosis Date   • Anemia     iron deficiency   • Iron deficiency anemia due to chronic blood loss    • Uterine fibroid    • Vitamin D deficiency      Past Surgical History:   Procedure Laterality Date   • COLONOSCOPY     • MOUTH SURGERY      wisdom teeth   • MYOMECTOMY VAGINAL APPROACH N/A 2016   • THYROID SURGERY     • WISDOM TOOTH EXTRACTION         Current Medications:    Current Outpatient Medications:   •  calcium carb-cholecalciferol  "(CALCIUM 600+D3) 600-800 MG-UNIT tablet, Take 1 tablet by mouth Daily., Disp: , Rfl:   •  cyclobenzaprine (FLEXERIL) 10 MG tablet, Take 1 tablet by mouth 3 (Three) Times a Day As Needed for Muscle Spasms., Disp: 60 tablet, Rfl: 1  •  nabumetone (RELAFEN) 750 MG tablet, Take 1 tablet by mouth 2 (Two) Times a Day As Needed for Mild Pain ., Disp: 60 tablet, Rfl: 0  •  omeprazole (priLOSEC) 40 MG capsule, Take 40 mg by mouth Daily., Disp: , Rfl:     Allergies:  No Known Allergies    Review of Systems:  Review of Systems   Constitutional: Negative for diaphoresis, fatigue and fever.   Respiratory: Negative for shortness of breath.    Cardiovascular: Negative for chest pain and palpitations.   Gastrointestinal: Negative for abdominal pain, nausea and vomiting.   Genitourinary: Positive for urinary incontinence.   Musculoskeletal: Negative for back pain and neck pain.   Neurological: Negative for dizziness, syncope, weakness, light-headedness and confusion.   All other systems reviewed and are negative.        Physical Examination:  Vitals:    08/01/19 0921   BP: 122/74   Temp: 97.9 °F (36.6 °C)   Weight: 120 kg (264 lb)   Height: 163.8 cm (64.5\")       Physical Exam   Constitutional: She is oriented to person, place, and time. She appears well-developed and well-nourished.   HENT:   Head: Normocephalic and atraumatic.   Neck: Normal range of motion. Neck supple.   Neurological: She is alert and oriented to person, place, and time. She has normal strength. She displays no atrophy.   Reflex Scores:       Patellar reflexes are 1+ on the right side and 1+ on the left side.       Achilles reflexes are 1+ on the right side and 1+ on the left side.  SLR positive at 45 degrees on the left.  SLR negative on the right  Gait is normal.  Able to perform standing on toes and heels   Skin: Skin is warm and dry.         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  She is only had lumbar x-rays to date.  Her back pain and left-sided sciatica symptoms have been treated with conservative therapy for months to no avail.  I have ordered a lumbar MRI without contrast for further evaluation.  Patient is concerned with pain management as she has been unable to work.  I have put in a referral to Dr. Shane at this time.  I will see her back in 1 week with MRI.  I advised her to continue conservative therapy including ice or heat to her posterior left hip area, anti-inflammatories and muscle relaxers as needed.  She will contact her office if she were to develop worsening symptoms in the meantime.    Follow Up:  1 week    Shannon Kay PA-C

## 2019-08-08 ENCOUNTER — OFFICE VISIT (OUTPATIENT)
Dept: NEUROSURGERY | Facility: CLINIC | Age: 39
End: 2019-08-08

## 2019-08-08 ENCOUNTER — HOSPITAL ENCOUNTER (OUTPATIENT)
Dept: MRI IMAGING | Facility: HOSPITAL | Age: 39
Discharge: HOME | End: 2019-08-08
Admitting: PHYSICIAN ASSISTANT

## 2019-08-08 VITALS
DIASTOLIC BLOOD PRESSURE: 86 MMHG | HEIGHT: 65 IN | WEIGHT: 265 LBS | BODY MASS INDEX: 44.15 KG/M2 | TEMPERATURE: 98 F | SYSTOLIC BLOOD PRESSURE: 128 MMHG

## 2019-08-08 DIAGNOSIS — M54.42 ACUTE MIDLINE LOW BACK PAIN WITH LEFT-SIDED SCIATICA: Primary | ICD-10-CM

## 2019-08-08 DIAGNOSIS — M54.42 ACUTE LOW BACK PAIN WITH LEFT-SIDED SCIATICA, UNSPECIFIED BACK PAIN LATERALITY: ICD-10-CM

## 2019-08-08 PROCEDURE — 72148 MRI LUMBAR SPINE W/O DYE: CPT

## 2019-08-08 PROCEDURE — 99213 OFFICE O/P EST LOW 20 MIN: CPT | Performed by: PHYSICIAN ASSISTANT

## 2019-08-08 RX ORDER — GABAPENTIN 300 MG/1
300 CAPSULE ORAL 3 TIMES DAILY
COMMUNITY
End: 2019-10-28

## 2019-08-08 NOTE — PROGRESS NOTES
Patient: Anne Marie May  : 1980  Gender: female    Primary Care Provider: Nic Greer MD    Chief Complaint:   Chief Complaint   Patient presents with   • Back Pain       History of Present Illness:  Ms. May is a 39-year-old female who presents today for follow-up of back pain.  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  This pain radiates to her left hip and is associated with numbness which extends to her left calf.  She is exhausted several conservative options including physical therapy and medications.  She presents today with a new lumbar MRI.      Past Medical and Surgical History:  Past Medical History:   Diagnosis Date   • Anemia     iron deficiency   • Iron deficiency anemia due to chronic blood loss    • Uterine fibroid    • Vitamin D deficiency      Past Surgical History:   Procedure Laterality Date   • COLONOSCOPY     • MOUTH SURGERY      wisdom teeth   • MYOMECTOMY VAGINAL APPROACH N/A 2016   • THYROID SURGERY     • WISDOM TOOTH EXTRACTION         Current Medications:    Current Outpatient Medications:   •  calcium carb-cholecalciferol (CALCIUM 600+D3) 600-800 MG-UNIT tablet, Take 1 tablet by mouth Daily., Disp: , Rfl:   •  cyclobenzaprine (FLEXERIL) 10 MG tablet, Take 1 tablet by mouth 3 (Three) Times a Day As Needed for Muscle Spasms., Disp: 60 tablet, Rfl: 1  •  gabapentin (NEURONTIN) 300 MG capsule, Take 300 mg by mouth 3 (Three) Times a Day., Disp: , Rfl:   •  nabumetone (RELAFEN) 750 MG tablet, Take 1 tablet by mouth 2 (Two) Times a Day As Needed for Mild Pain ., Disp: 60 tablet, Rfl: 0  •  omeprazole (priLOSEC) 40 MG capsule, Take 40 mg by mouth Daily., Disp: , Rfl:     Allergies:  No Known Allergies      Review of Systems   Constitutional: Negative for activity change, appetite change, chills, diaphoresis, fatigue, fever, unexpected weight gain and unexpected  weight loss.   HENT: Negative for congestion, dental problem, drooling, ear discharge, ear pain, facial swelling, hearing loss, mouth sores, nosebleeds, postnasal drip, rhinorrhea, sinus pressure, sneezing, sore throat, tinnitus, trouble swallowing and voice change.    Eyes: Negative for blurred vision, double vision, photophobia, pain, discharge, redness, itching and visual disturbance.   Respiratory: Negative for apnea, cough, choking, chest tightness, shortness of breath, wheezing and stridor.    Cardiovascular: Negative for chest pain, palpitations and leg swelling.   Gastrointestinal: Negative for abdominal distention, abdominal pain, anal bleeding, blood in stool, constipation, diarrhea, nausea, rectal pain, vomiting, GERD and indigestion.   Endocrine: Negative for cold intolerance, heat intolerance, polydipsia, polyphagia and polyuria.   Genitourinary: Positive for urinary incontinence. Negative for breast discharge, decreased libido, decreased urine volume, difficulty urinating, dysuria, flank pain, frequency, genital sores, hematuria and urgency.   Musculoskeletal: Positive for back pain and myalgias. Negative for arthralgias, gait problem, joint swelling, neck pain, neck stiffness and bursitis.   Skin: Negative for color change, dry skin, pallor, rash, skin lesions and bruise.   Allergic/Immunologic: Negative for environmental allergies, food allergies and immunocompromised state.   Neurological: Negative for dizziness, tremors, seizures, syncope, facial asymmetry, speech difficulty, weakness, light-headedness, numbness, headache, memory problem and confusion.   Hematological: Negative for adenopathy. Does not bruise/bleed easily.   Psychiatric/Behavioral: Negative for agitation, behavioral problems, decreased concentration, dysphoric mood, hallucinations, self-injury, sleep disturbance, suicidal ideas, negative for hyperactivity, depressed mood and stress. The patient is not nervous/anxious.    All other  "systems reviewed and are negative.        Physical Exam  Constitutional: She is oriented to person, place, and time. She appears well-developed and well-nourished.   HENT:   Head: Normocephalic and atraumatic.   Neck: Normal range of motion. Neck supple.   Neurological: She is alert and oriented to person, place, and time. She has normal strength. She displays no atrophy.         Vitals:    08/08/19 1304   BP: 128/86   BP Location: Left arm   Patient Position: Sitting   Temp: 98 °F (36.7 °C)   TempSrc: Temporal   Weight: 120 kg (265 lb)   Height: 163.8 cm (64.5\")         Imaging Review:  Lumbar MRI (8/8/2019): Central disc protrusion at the L4/L5 level creating mass  effect on the nerve roots and compromise of the left nerve root. No  significant neuroforaminal narrowing. Moderate to severe central spinal  canal stenosis    Assessment:  Lumbar disc herniation    Plan:  Ms. May is seen today in follow-up. Lumbar MRI was reviewed and reveals a broad-based disc bulge at L4-5, most prominent on the left comprising the exiting left nerve root. Patient is anxious to return to work and would like to proceed with a full conservative course. We will arrange a referral for JONATHAN at the earliest date that we can. She would also like to try chiropractic and/or massage therapy.   I will review patient's diagnostic imaging with Dr. Bright and call patient with additional recommendations.  We will base Ms. May's follow up upon PM referral and progress after this. She will contact our office with any concerns in the meantime.         Shannon Kay PA-C  "

## 2019-08-12 ENCOUNTER — TELEPHONE (OUTPATIENT)
Dept: NEUROSURGERY | Facility: CLINIC | Age: 39
End: 2019-08-12

## 2019-08-12 NOTE — TELEPHONE ENCOUNTER
Attempted to call patient to discuss MRI findings which were reviewed with Dr. Bright.  We will continue with conservative measures if patient wishes to do so.  Asked that patient return our call with an update on her symptoms.

## 2019-10-03 ENCOUNTER — OFFICE VISIT (OUTPATIENT)
Dept: NEUROSURGERY | Facility: CLINIC | Age: 39
End: 2019-10-03

## 2019-10-03 VITALS — WEIGHT: 265 LBS | BODY MASS INDEX: 44.15 KG/M2 | HEIGHT: 65 IN

## 2019-10-03 DIAGNOSIS — M51.26 HERNIATION OF INTERVERTEBRAL DISC OF LUMBAR SPINE: Primary | ICD-10-CM

## 2019-10-03 DIAGNOSIS — M51.36 DEGENERATIVE LUMBAR DISC: ICD-10-CM

## 2019-10-03 PROCEDURE — 99213 OFFICE O/P EST LOW 20 MIN: CPT | Performed by: NEUROLOGICAL SURGERY

## 2019-10-03 RX ORDER — IBUPROFEN 800 MG/1
800 TABLET ORAL EVERY 6 HOURS PRN
COMMUNITY
End: 2019-12-02

## 2019-10-03 RX ORDER — TRAMADOL HYDROCHLORIDE 50 MG/1
50 TABLET ORAL EVERY 6 HOURS PRN
COMMUNITY
End: 2019-11-19

## 2019-10-03 NOTE — PROGRESS NOTES
Subjective   Anne Marie May is a 39 y.o. female who presents for follow up of left hip and leg pain.     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  Her MRI scan lumbar spine was done on 8/8/2019 and showed a very large central and left paracentral disc herniation at L4-5 compressing the left L5 nerve root.  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  She came to the office urgently today because of a level of pain that exceeded that she had experienced before.    The following portions of the patient's history were reviewed, updated as appropriate and approved: allergies, current medications, past family history, past medical history, past social history, past surgical history, review of systems and problem list.     Review of Systems   Constitutional: Negative for activity change, appetite change, chills, diaphoresis, fatigue, fever and unexpected weight change.   HENT: Negative for congestion, dental problem, drooling, ear discharge, ear pain, facial swelling, hearing loss, mouth sores, nosebleeds, postnasal drip, rhinorrhea, sinus pressure, sneezing, sore throat, tinnitus, trouble swallowing and voice change.    Eyes: Negative for photophobia, pain, discharge, redness, itching and visual disturbance.   Respiratory: Negative for apnea, cough, choking, chest tightness, shortness of breath, wheezing and stridor.    Cardiovascular: Negative for chest pain, palpitations and leg swelling.   Gastrointestinal: Negative for abdominal distention, abdominal pain, anal bleeding, blood in stool, constipation, diarrhea, nausea, rectal pain and vomiting.   Endocrine: Negative for cold intolerance,  heat intolerance, polydipsia, polyphagia and polyuria.   Genitourinary: Negative for decreased urine volume, difficulty urinating, dysuria, enuresis, flank pain, frequency, genital sores, hematuria and urgency.   Musculoskeletal: Positive for back pain and myalgias. Negative for arthralgias, gait problem, joint swelling, neck pain and neck stiffness.   Skin: Negative for color change, pallor, rash and wound.   Allergic/Immunologic: Negative for environmental allergies, food allergies and immunocompromised state.   Neurological: Negative for dizziness, tremors, seizures, syncope, facial asymmetry, speech difficulty, weakness, light-headedness, numbness and headaches.   Hematological: Negative for adenopathy. Does not bruise/bleed easily.   Psychiatric/Behavioral: Negative for agitation, behavioral problems, confusion, decreased concentration, dysphoric mood, hallucinations, self-injury, sleep disturbance and suicidal ideas. The patient is not nervous/anxious and is not hyperactive.        Objective    NEUROLOGICAL EXAMINATION:    SLR +20 degrees left.    Assessment   Herniated disc L4-5 left paracentral, left L5 radiculopathy, failure to improve after 4 months of concerted conservative management.       Plan   Recommend minimal access laminotomy discectomy L4-5 left.       Jeff Bright MD

## 2019-10-04 DIAGNOSIS — M54.42 ACUTE LEFT-SIDED LOW BACK PAIN WITH LEFT-SIDED SCIATICA: ICD-10-CM

## 2019-10-04 PROBLEM — M51.36 DEGENERATIVE LUMBAR DISC: Status: ACTIVE | Noted: 2019-10-04

## 2019-10-04 PROBLEM — M51.26 HERNIATION OF INTERVERTEBRAL DISC OF LUMBAR SPINE: Status: ACTIVE | Noted: 2019-10-04

## 2019-10-04 PROBLEM — M51.369 DEGENERATIVE LUMBAR DISC: Status: ACTIVE | Noted: 2019-10-04

## 2019-10-04 RX ORDER — CYCLOBENZAPRINE HCL 10 MG
10 TABLET ORAL 3 TIMES DAILY PRN
Qty: 60 TABLET | Refills: 1 | Status: SHIPPED | OUTPATIENT
Start: 2019-10-04 | End: 2019-11-19

## 2019-10-04 RX ORDER — NABUMETONE 750 MG/1
750 TABLET, FILM COATED ORAL 2 TIMES DAILY PRN
Qty: 60 TABLET | Refills: 0 | Status: SHIPPED | OUTPATIENT
Start: 2019-10-04 | End: 2019-10-28

## 2019-10-04 NOTE — TELEPHONE ENCOUNTER
Provider:  Deangelo  Caller: Anne Marie     Phone #:  [phone]  Surgery:  Lumbar lami/Disc  Surgery Date:  Upcoming 11/6/19  Last visit:     Next visit:     DEV:         Reason for call:           Flexeril, nabumetone rf

## 2019-10-07 ENCOUNTER — PREP FOR SURGERY (OUTPATIENT)
Dept: OTHER | Facility: HOSPITAL | Age: 39
End: 2019-10-07

## 2019-10-07 DIAGNOSIS — M51.26 HERNIATION OF INTERVERTEBRAL DISC OF LUMBAR SPINE: Primary | ICD-10-CM

## 2019-10-07 RX ORDER — CHLORHEXIDINE GLUCONATE 4 G/100ML
SOLUTION TOPICAL
Qty: 120 ML | Refills: 0 | Status: SHIPPED | OUTPATIENT
Start: 2019-10-07 | End: 2019-11-19

## 2019-10-07 RX ORDER — HYDROCODONE BITARTRATE AND ACETAMINOPHEN 7.5; 325 MG/1; MG/1
1 TABLET ORAL ONCE
Status: CANCELLED | OUTPATIENT
Start: 2019-10-07 | End: 2019-10-07

## 2019-10-07 RX ORDER — SODIUM CHLORIDE AND POTASSIUM CHLORIDE 150; 900 MG/100ML; MG/100ML
50 INJECTION, SOLUTION INTRAVENOUS CONTINUOUS
Status: CANCELLED | OUTPATIENT
Start: 2019-10-07

## 2019-10-07 RX ORDER — IBUPROFEN 800 MG/1
800 TABLET ORAL ONCE
Status: CANCELLED | OUTPATIENT
Start: 2019-10-07 | End: 2019-10-07

## 2019-10-07 RX ORDER — SODIUM CHLORIDE 0.9 % (FLUSH) 0.9 %
3 SYRINGE (ML) INJECTION EVERY 12 HOURS SCHEDULED
Status: CANCELLED | OUTPATIENT
Start: 2019-10-07

## 2019-10-07 RX ORDER — ACETAMINOPHEN 325 MG/1
650 TABLET ORAL ONCE
Status: CANCELLED | OUTPATIENT
Start: 2019-10-07 | End: 2019-10-07

## 2019-10-07 RX ORDER — CEFAZOLIN SODIUM 2 G/100ML
2 INJECTION, SOLUTION INTRAVENOUS ONCE
Status: CANCELLED | OUTPATIENT
Start: 2019-10-07 | End: 2019-10-07

## 2019-10-21 ENCOUNTER — TELEPHONE (OUTPATIENT)
Dept: NEUROSURGERY | Facility: CLINIC | Age: 39
End: 2019-10-21

## 2019-10-21 DIAGNOSIS — M51.26 HERNIATION OF INTERVERTEBRAL DISC OF LUMBAR SPINE: Primary | ICD-10-CM

## 2019-10-21 NOTE — TELEPHONE ENCOUNTER
Provider:  Deangelo  Caller: patient  Time of call:   10:39  Phone #:  [phone]  Surgery:  Upcoming Lumbar discectomy, laminectomy  Surgery Date:  11/06/19  Last visit:   10/03/19  Next visit: surgery    DEV:         Reason for call:     Patient called and said she is scheduled for surgery, however she states her symptoms on the left side have subsided.  She is now having symptoms on the right side.  She wants to know if Dr. Bright wants to get a new MRI since her pain has deferred itself?

## 2019-10-21 NOTE — TELEPHONE ENCOUNTER
Please ask if symptoms are as severe on the right side.  I will have to discuss this with Dr. Bright when he returns later this week.

## 2019-10-21 NOTE — TELEPHONE ENCOUNTER
She said they are not as severe on the right side, but it is bad. The RLE and calf are causing enough discomfort that it wakes her at night when she turns over.    The left side is 1/10 th of what it was before.  Not so much a issue.

## 2019-10-28 ENCOUNTER — APPOINTMENT (OUTPATIENT)
Dept: PREADMISSION TESTING | Facility: HOSPITAL | Age: 39
End: 2019-10-28

## 2019-10-28 ENCOUNTER — HOSPITAL ENCOUNTER (OUTPATIENT)
Dept: MRI IMAGING | Facility: HOSPITAL | Age: 39
Discharge: HOME | End: 2019-10-28
Admitting: PHYSICIAN ASSISTANT

## 2019-10-28 VITALS — WEIGHT: 266.54 LBS | HEIGHT: 66 IN | BODY MASS INDEX: 42.84 KG/M2

## 2019-10-28 DIAGNOSIS — M51.26 HERNIATION OF INTERVERTEBRAL DISC OF LUMBAR SPINE: ICD-10-CM

## 2019-10-28 LAB
ANION GAP SERPL CALCULATED.3IONS-SCNC: 12 MMOL/L (ref 5–15)
B-HCG UR QL: NEGATIVE
BUN BLD-MCNC: 9 MG/DL (ref 6–20)
BUN/CREAT SERPL: 14.8 (ref 7–25)
CALCIUM SPEC-SCNC: 9.4 MG/DL (ref 8.6–10.5)
CHLORIDE SERPL-SCNC: 101 MMOL/L (ref 98–107)
CO2 SERPL-SCNC: 26 MMOL/L (ref 22–29)
CREAT BLD-MCNC: 0.61 MG/DL (ref 0.57–1)
DEPRECATED RDW RBC AUTO: 43.4 FL (ref 37–54)
ERYTHROCYTE [DISTWIDTH] IN BLOOD BY AUTOMATED COUNT: 13.7 % (ref 12.3–15.4)
GFR SERPL CREATININE-BSD FRML MDRD: 108 ML/MIN/1.73
GLUCOSE BLD-MCNC: 102 MG/DL (ref 65–99)
HCT VFR BLD AUTO: 37.4 % (ref 34–46.6)
HGB BLD-MCNC: 11.4 G/DL (ref 12–15.9)
MCH RBC QN AUTO: 26.6 PG (ref 26.6–33)
MCHC RBC AUTO-ENTMCNC: 30.5 G/DL (ref 31.5–35.7)
MCV RBC AUTO: 87.2 FL (ref 79–97)
PLATELET # BLD AUTO: 423 10*3/MM3 (ref 140–450)
PMV BLD AUTO: 9.6 FL (ref 6–12)
POTASSIUM BLD-SCNC: 4 MMOL/L (ref 3.5–5.2)
RBC # BLD AUTO: 4.29 10*6/MM3 (ref 3.77–5.28)
SODIUM BLD-SCNC: 139 MMOL/L (ref 136–145)
WBC NRBC COR # BLD: 7.15 10*3/MM3 (ref 3.4–10.8)

## 2019-10-28 PROCEDURE — 72148 MRI LUMBAR SPINE W/O DYE: CPT

## 2019-10-28 PROCEDURE — 81025 URINE PREGNANCY TEST: CPT | Performed by: NEUROLOGICAL SURGERY

## 2019-10-28 PROCEDURE — 87081 CULTURE SCREEN ONLY: CPT | Performed by: ANESTHESIOLOGY

## 2019-10-28 PROCEDURE — 85027 COMPLETE CBC AUTOMATED: CPT | Performed by: NEUROLOGICAL SURGERY

## 2019-10-28 PROCEDURE — 80048 BASIC METABOLIC PNL TOTAL CA: CPT | Performed by: NEUROLOGICAL SURGERY

## 2019-10-28 PROCEDURE — 36415 COLL VENOUS BLD VENIPUNCTURE: CPT

## 2019-10-29 LAB — MRSA SPEC QL CULT: NORMAL

## 2019-11-05 ENCOUNTER — ANESTHESIA EVENT (OUTPATIENT)
Dept: PERIOP | Facility: HOSPITAL | Age: 39
End: 2019-11-05

## 2019-11-05 RX ORDER — FAMOTIDINE 10 MG/ML
20 INJECTION, SOLUTION INTRAVENOUS ONCE
Status: CANCELLED | OUTPATIENT
Start: 2019-11-05 | End: 2019-11-05

## 2019-11-06 ENCOUNTER — APPOINTMENT (OUTPATIENT)
Dept: GENERAL RADIOLOGY | Facility: HOSPITAL | Age: 39
End: 2019-11-06

## 2019-11-06 ENCOUNTER — HOSPITAL ENCOUNTER (OUTPATIENT)
Facility: HOSPITAL | Age: 39
Setting detail: HOSPITAL OUTPATIENT SURGERY
Discharge: HOME | End: 2019-11-06
Attending: NEUROLOGICAL SURGERY | Admitting: NEUROLOGICAL SURGERY

## 2019-11-06 ENCOUNTER — ANESTHESIA (OUTPATIENT)
Dept: PERIOP | Facility: HOSPITAL | Age: 39
End: 2019-11-06

## 2019-11-06 VITALS
TEMPERATURE: 98.6 F | HEART RATE: 94 BPM | BODY MASS INDEX: 42.75 KG/M2 | SYSTOLIC BLOOD PRESSURE: 149 MMHG | DIASTOLIC BLOOD PRESSURE: 104 MMHG | WEIGHT: 266 LBS | RESPIRATION RATE: 20 BRPM | HEIGHT: 66 IN | OXYGEN SATURATION: 98 %

## 2019-11-06 LAB
B-HCG UR QL: NEGATIVE
INTERNAL NEGATIVE CONTROL: NEGATIVE
INTERNAL POSITIVE CONTROL: POSITIVE
Lab: NORMAL

## 2019-11-06 PROCEDURE — 63030 LAMOT DCMPRN NRV RT 1 LMBR: CPT | Performed by: NEUROLOGICAL SURGERY

## 2019-11-06 PROCEDURE — 25010000002 PROPOFOL 10 MG/ML EMULSION: Performed by: NURSE ANESTHETIST, CERTIFIED REGISTERED

## 2019-11-06 PROCEDURE — 25010000002 ONDANSETRON PER 1 MG: Performed by: NURSE ANESTHETIST, CERTIFIED REGISTERED

## 2019-11-06 PROCEDURE — 25010000003 CEFAZOLIN IN DEXTROSE 2-4 GM/100ML-% SOLUTION: Performed by: NEUROLOGICAL SURGERY

## 2019-11-06 PROCEDURE — 25010000002 MIDAZOLAM PER 1 MG: Performed by: NEUROLOGICAL SURGERY

## 2019-11-06 PROCEDURE — 81025 URINE PREGNANCY TEST: CPT | Performed by: ANESTHESIOLOGY

## 2019-11-06 PROCEDURE — 76000 FLUOROSCOPY <1 HR PHYS/QHP: CPT

## 2019-11-06 PROCEDURE — 25010000002 NEOSTIGMINE 10 MG/10ML SOLUTION: Performed by: NURSE ANESTHETIST, CERTIFIED REGISTERED

## 2019-11-06 PROCEDURE — S0260 H&P FOR SURGERY: HCPCS | Performed by: NEUROLOGICAL SURGERY

## 2019-11-06 PROCEDURE — 25010000002 FENTANYL CITRATE (PF) 100 MCG/2ML SOLUTION: Performed by: NURSE ANESTHETIST, CERTIFIED REGISTERED

## 2019-11-06 PROCEDURE — 25010000002 METHYLPREDNISOLONE PER 40 MG: Performed by: NEUROLOGICAL SURGERY

## 2019-11-06 PROCEDURE — 25010000002 DEXAMETHASONE PER 1 MG: Performed by: NURSE ANESTHETIST, CERTIFIED REGISTERED

## 2019-11-06 RX ORDER — MIDAZOLAM HYDROCHLORIDE 1 MG/ML
2 INJECTION INTRAMUSCULAR; INTRAVENOUS
Status: COMPLETED | OUTPATIENT
Start: 2019-11-06 | End: 2019-11-06

## 2019-11-06 RX ORDER — HYDROCODONE BITARTRATE AND ACETAMINOPHEN 5; 325 MG/1; MG/1
1 TABLET ORAL EVERY 6 HOURS PRN
Qty: 30 TABLET | Refills: 0 | Status: SHIPPED | OUTPATIENT
Start: 2019-11-06 | End: 2019-11-19

## 2019-11-06 RX ORDER — HYDROCODONE BITARTRATE AND ACETAMINOPHEN 5; 325 MG/1; MG/1
1 TABLET ORAL ONCE AS NEEDED
Status: DISCONTINUED | OUTPATIENT
Start: 2019-11-06 | End: 2019-11-07 | Stop reason: HOSPADM

## 2019-11-06 RX ORDER — PROMETHAZINE HYDROCHLORIDE 25 MG/ML
6.25 INJECTION, SOLUTION INTRAMUSCULAR; INTRAVENOUS ONCE AS NEEDED
Status: DISCONTINUED | OUTPATIENT
Start: 2019-11-06 | End: 2019-11-07 | Stop reason: HOSPADM

## 2019-11-06 RX ORDER — HYDROCODONE BITARTRATE AND ACETAMINOPHEN 7.5; 325 MG/1; MG/1
1 TABLET ORAL ONCE
Status: COMPLETED | OUTPATIENT
Start: 2019-11-06 | End: 2019-11-06

## 2019-11-06 RX ORDER — NEOSTIGMINE METHYLSULFATE 1 MG/ML
INJECTION, SOLUTION INTRAVENOUS AS NEEDED
Status: DISCONTINUED | OUTPATIENT
Start: 2019-11-06 | End: 2019-11-06 | Stop reason: SURG

## 2019-11-06 RX ORDER — FAMOTIDINE 20 MG/1
20 TABLET, FILM COATED ORAL ONCE
Status: COMPLETED | OUTPATIENT
Start: 2019-11-06 | End: 2019-11-06

## 2019-11-06 RX ORDER — CEFAZOLIN SODIUM 2 G/100ML
2 INJECTION, SOLUTION INTRAVENOUS ONCE
Status: COMPLETED | OUTPATIENT
Start: 2019-11-06 | End: 2019-11-06

## 2019-11-06 RX ORDER — IPRATROPIUM BROMIDE AND ALBUTEROL SULFATE 2.5; .5 MG/3ML; MG/3ML
3 SOLUTION RESPIRATORY (INHALATION) ONCE AS NEEDED
Status: DISCONTINUED | OUTPATIENT
Start: 2019-11-06 | End: 2019-11-07 | Stop reason: HOSPADM

## 2019-11-06 RX ORDER — ACETAMINOPHEN 325 MG/1
650 TABLET ORAL ONCE
Status: COMPLETED | OUTPATIENT
Start: 2019-11-06 | End: 2019-11-06

## 2019-11-06 RX ORDER — SODIUM CHLORIDE 0.9 % (FLUSH) 0.9 %
3 SYRINGE (ML) INJECTION EVERY 12 HOURS SCHEDULED
Status: DISCONTINUED | OUTPATIENT
Start: 2019-11-06 | End: 2019-11-07 | Stop reason: HOSPADM

## 2019-11-06 RX ORDER — NALOXONE HCL 0.4 MG/ML
0.4 VIAL (ML) INJECTION AS NEEDED
Status: DISCONTINUED | OUTPATIENT
Start: 2019-11-06 | End: 2019-11-07 | Stop reason: HOSPADM

## 2019-11-06 RX ORDER — HYDROMORPHONE HYDROCHLORIDE 1 MG/ML
0.5 INJECTION, SOLUTION INTRAMUSCULAR; INTRAVENOUS; SUBCUTANEOUS
Status: DISCONTINUED | OUTPATIENT
Start: 2019-11-06 | End: 2019-11-07 | Stop reason: HOSPADM

## 2019-11-06 RX ORDER — DEXAMETHASONE SODIUM PHOSPHATE 4 MG/ML
INJECTION, SOLUTION INTRA-ARTICULAR; INTRALESIONAL; INTRAMUSCULAR; INTRAVENOUS; SOFT TISSUE AS NEEDED
Status: DISCONTINUED | OUTPATIENT
Start: 2019-11-06 | End: 2019-11-06 | Stop reason: SURG

## 2019-11-06 RX ORDER — LIDOCAINE HYDROCHLORIDE 10 MG/ML
0.5 INJECTION, SOLUTION EPIDURAL; INFILTRATION; INTRACAUDAL; PERINEURAL ONCE AS NEEDED
Status: COMPLETED | OUTPATIENT
Start: 2019-11-06 | End: 2019-11-06

## 2019-11-06 RX ORDER — ONDANSETRON 2 MG/ML
4 INJECTION INTRAMUSCULAR; INTRAVENOUS ONCE AS NEEDED
Status: DISCONTINUED | OUTPATIENT
Start: 2019-11-06 | End: 2019-11-07 | Stop reason: HOSPADM

## 2019-11-06 RX ORDER — GLYCOPYRROLATE 0.2 MG/ML
INJECTION INTRAMUSCULAR; INTRAVENOUS AS NEEDED
Status: DISCONTINUED | OUTPATIENT
Start: 2019-11-06 | End: 2019-11-06 | Stop reason: SURG

## 2019-11-06 RX ORDER — FENTANYL CITRATE 50 UG/ML
50 INJECTION, SOLUTION INTRAMUSCULAR; INTRAVENOUS
Status: DISCONTINUED | OUTPATIENT
Start: 2019-11-06 | End: 2019-11-07 | Stop reason: HOSPADM

## 2019-11-06 RX ORDER — SODIUM CHLORIDE 0.9 % (FLUSH) 0.9 %
3 SYRINGE (ML) INJECTION EVERY 12 HOURS SCHEDULED
Status: DISCONTINUED | OUTPATIENT
Start: 2019-11-06 | End: 2019-11-06 | Stop reason: HOSPADM

## 2019-11-06 RX ORDER — LIDOCAINE HYDROCHLORIDE 10 MG/ML
INJECTION, SOLUTION EPIDURAL; INFILTRATION; INTRACAUDAL; PERINEURAL AS NEEDED
Status: DISCONTINUED | OUTPATIENT
Start: 2019-11-06 | End: 2019-11-06 | Stop reason: SURG

## 2019-11-06 RX ORDER — MAGNESIUM HYDROXIDE 1200 MG/15ML
LIQUID ORAL AS NEEDED
Status: DISCONTINUED | OUTPATIENT
Start: 2019-11-06 | End: 2019-11-06 | Stop reason: HOSPADM

## 2019-11-06 RX ORDER — PROPOFOL 10 MG/ML
VIAL (ML) INTRAVENOUS AS NEEDED
Status: DISCONTINUED | OUTPATIENT
Start: 2019-11-06 | End: 2019-11-06 | Stop reason: SURG

## 2019-11-06 RX ORDER — LABETALOL HYDROCHLORIDE 5 MG/ML
5 INJECTION, SOLUTION INTRAVENOUS
Status: DISCONTINUED | OUTPATIENT
Start: 2019-11-06 | End: 2019-11-07 | Stop reason: HOSPADM

## 2019-11-06 RX ORDER — MEPERIDINE HYDROCHLORIDE 25 MG/ML
12.5 INJECTION INTRAMUSCULAR; INTRAVENOUS; SUBCUTANEOUS
Status: DISCONTINUED | OUTPATIENT
Start: 2019-11-06 | End: 2019-11-07 | Stop reason: HOSPADM

## 2019-11-06 RX ORDER — SODIUM CHLORIDE 0.9 % (FLUSH) 0.9 %
3-10 SYRINGE (ML) INJECTION AS NEEDED
Status: DISCONTINUED | OUTPATIENT
Start: 2019-11-06 | End: 2019-11-06 | Stop reason: HOSPADM

## 2019-11-06 RX ORDER — BUPIVACAINE HYDROCHLORIDE AND EPINEPHRINE 2.5; 5 MG/ML; UG/ML
INJECTION, SOLUTION EPIDURAL; INFILTRATION; INTRACAUDAL; PERINEURAL AS NEEDED
Status: DISCONTINUED | OUTPATIENT
Start: 2019-11-06 | End: 2019-11-06 | Stop reason: HOSPADM

## 2019-11-06 RX ORDER — METHYLPREDNISOLONE ACETATE 40 MG/ML
INJECTION, SUSPENSION INTRA-ARTICULAR; INTRALESIONAL; INTRAMUSCULAR; SOFT TISSUE AS NEEDED
Status: DISCONTINUED | OUTPATIENT
Start: 2019-11-06 | End: 2019-11-06 | Stop reason: HOSPADM

## 2019-11-06 RX ORDER — SODIUM CHLORIDE 0.9 % (FLUSH) 0.9 %
3-10 SYRINGE (ML) INJECTION AS NEEDED
Status: DISCONTINUED | OUTPATIENT
Start: 2019-11-06 | End: 2019-11-07 | Stop reason: HOSPADM

## 2019-11-06 RX ORDER — LABETALOL HYDROCHLORIDE 5 MG/ML
INJECTION, SOLUTION INTRAVENOUS AS NEEDED
Status: DISCONTINUED | OUTPATIENT
Start: 2019-11-06 | End: 2019-11-06 | Stop reason: SURG

## 2019-11-06 RX ORDER — FENTANYL CITRATE 50 UG/ML
INJECTION, SOLUTION INTRAMUSCULAR; INTRAVENOUS AS NEEDED
Status: DISCONTINUED | OUTPATIENT
Start: 2019-11-06 | End: 2019-11-06 | Stop reason: SURG

## 2019-11-06 RX ORDER — IBUPROFEN 800 MG/1
800 TABLET ORAL ONCE
Status: COMPLETED | OUTPATIENT
Start: 2019-11-06 | End: 2019-11-06

## 2019-11-06 RX ORDER — SODIUM CHLORIDE, SODIUM LACTATE, POTASSIUM CHLORIDE, CALCIUM CHLORIDE 600; 310; 30; 20 MG/100ML; MG/100ML; MG/100ML; MG/100ML
9 INJECTION, SOLUTION INTRAVENOUS CONTINUOUS
Status: DISCONTINUED | OUTPATIENT
Start: 2019-11-06 | End: 2019-11-07 | Stop reason: HOSPADM

## 2019-11-06 RX ORDER — DOCUSATE SODIUM 100 MG/1
100 CAPSULE, LIQUID FILLED ORAL 2 TIMES DAILY PRN
COMMUNITY
End: 2022-11-16

## 2019-11-06 RX ORDER — ONDANSETRON 2 MG/ML
INJECTION INTRAMUSCULAR; INTRAVENOUS AS NEEDED
Status: DISCONTINUED | OUTPATIENT
Start: 2019-11-06 | End: 2019-11-06 | Stop reason: SURG

## 2019-11-06 RX ORDER — HYDRALAZINE HYDROCHLORIDE 20 MG/ML
5 INJECTION INTRAMUSCULAR; INTRAVENOUS
Status: DISCONTINUED | OUTPATIENT
Start: 2019-11-06 | End: 2019-11-07 | Stop reason: HOSPADM

## 2019-11-06 RX ORDER — PROMETHAZINE HYDROCHLORIDE 25 MG/1
25 TABLET ORAL ONCE AS NEEDED
Status: DISCONTINUED | OUTPATIENT
Start: 2019-11-06 | End: 2019-11-07 | Stop reason: HOSPADM

## 2019-11-06 RX ORDER — ROCURONIUM BROMIDE 10 MG/ML
INJECTION, SOLUTION INTRAVENOUS AS NEEDED
Status: DISCONTINUED | OUTPATIENT
Start: 2019-11-06 | End: 2019-11-06 | Stop reason: SURG

## 2019-11-06 RX ORDER — PROMETHAZINE HYDROCHLORIDE 25 MG/1
25 SUPPOSITORY RECTAL ONCE AS NEEDED
Status: DISCONTINUED | OUTPATIENT
Start: 2019-11-06 | End: 2019-11-07 | Stop reason: HOSPADM

## 2019-11-06 RX ADMIN — CEFAZOLIN SODIUM 2 G: 2 INJECTION, SOLUTION INTRAVENOUS at 12:18

## 2019-11-06 RX ADMIN — LIDOCAINE HYDROCHLORIDE 0.5 ML: 10 INJECTION, SOLUTION EPIDURAL; INFILTRATION; INTRACAUDAL; PERINEURAL at 10:46

## 2019-11-06 RX ADMIN — ONDANSETRON 4 MG: 2 INJECTION INTRAMUSCULAR; INTRAVENOUS at 13:40

## 2019-11-06 RX ADMIN — IBUPROFEN 800 MG: 800 TABLET ORAL at 10:46

## 2019-11-06 RX ADMIN — LABETALOL HYDROCHLORIDE 5 MG: 5 INJECTION, SOLUTION INTRAVENOUS at 13:13

## 2019-11-06 RX ADMIN — MIDAZOLAM 1 MG: 1 INJECTION INTRAMUSCULAR; INTRAVENOUS at 12:25

## 2019-11-06 RX ADMIN — PROPOFOL 250 MG: 10 INJECTION, EMULSION INTRAVENOUS at 12:25

## 2019-11-06 RX ADMIN — NEOSTIGMINE METHYLSULFATE 5 MG: 1 INJECTION, SOLUTION INTRAVENOUS at 13:40

## 2019-11-06 RX ADMIN — GLYCOPYRROLATE 0.8 MG: 0.2 INJECTION, SOLUTION INTRAMUSCULAR; INTRAVENOUS at 13:40

## 2019-11-06 RX ADMIN — LIDOCAINE HYDROCHLORIDE 50 MG: 10 INJECTION, SOLUTION EPIDURAL; INFILTRATION; INTRACAUDAL; PERINEURAL at 12:25

## 2019-11-06 RX ADMIN — MIDAZOLAM 2 MG: 1 INJECTION INTRAMUSCULAR; INTRAVENOUS at 10:57

## 2019-11-06 RX ADMIN — HYDROCODONE BITARTRATE AND ACETAMINOPHEN 1 TABLET: 7.5; 325 TABLET ORAL at 10:46

## 2019-11-06 RX ADMIN — ACETAMINOPHEN 650 MG: 325 TABLET ORAL at 10:46

## 2019-11-06 RX ADMIN — ROCURONIUM BROMIDE 10 MG: 10 INJECTION INTRAVENOUS at 13:15

## 2019-11-06 RX ADMIN — FENTANYL CITRATE 50 MCG: 50 INJECTION, SOLUTION INTRAMUSCULAR; INTRAVENOUS at 12:51

## 2019-11-06 RX ADMIN — MIDAZOLAM 1 MG: 1 INJECTION INTRAMUSCULAR; INTRAVENOUS at 12:18

## 2019-11-06 RX ADMIN — ROCURONIUM BROMIDE 30 MG: 10 INJECTION INTRAVENOUS at 12:25

## 2019-11-06 RX ADMIN — HYDROCODONE BITARTRATE AND ACETAMINOPHEN 1 TABLET: 5; 325 TABLET ORAL at 14:30

## 2019-11-06 RX ADMIN — DEXAMETHASONE SODIUM PHOSPHATE 8 MG: 4 INJECTION, SOLUTION INTRAMUSCULAR; INTRAVENOUS at 12:43

## 2019-11-06 RX ADMIN — PROPOFOL 100 MG: 10 INJECTION, EMULSION INTRAVENOUS at 13:15

## 2019-11-06 RX ADMIN — FAMOTIDINE 20 MG: 20 TABLET ORAL at 10:46

## 2019-11-06 RX ADMIN — FENTANYL CITRATE 50 MCG: 50 INJECTION, SOLUTION INTRAMUSCULAR; INTRAVENOUS at 12:25

## 2019-11-06 RX ADMIN — SODIUM CHLORIDE, POTASSIUM CHLORIDE, SODIUM LACTATE AND CALCIUM CHLORIDE 9 ML/HR: 600; 310; 30; 20 INJECTION, SOLUTION INTRAVENOUS at 10:46

## 2019-11-06 NOTE — ANESTHESIA POSTPROCEDURE EVALUATION
Patient: Anne Marie May    Procedure Summary     Date:  11/06/19 Room / Location:   [facility]    Anesthesia Start:  1218 Anesthesia Stop:  1410    Procedure:  LUMBAR DISCECTOMY LAMINOTOMY  L4-5 LEFT (Left Spine Lumbar) Diagnosis:       Herniation of intervertebral disc of lumbar spine      Degenerative lumbar disc      (Herniation of intervertebral disc of lumbar spine [M51.26])      (Degenerative lumbar disc [M51.36])    Surgeon:  Jeff Bright MD Provider:  Musa Cramer MD    Anesthesia Type:  general ASA Status:  3          Anesthesia Type: general  Last vitals  /80 (11/06/19 1412)     Temp 98.2 °F (36.8 °C) (11/06/19 1411)      Pulse 91  (11/06/19 1412)     Resp 16 (11/06/19 1412)        SpO2 98 % (11/06/19 1412)        Post Anesthesia Care and Evaluation    Patient location during evaluation: PACU  Patient participation: complete - patient participated  Level of consciousness: awake and alert  Pain score: 0  Pain management: adequate  Airway patency: patent  Anesthetic complications: No anesthetic complications  PONV Status: none  Cardiovascular status: hemodynamically stable and acceptable  Respiratory status: nonlabored ventilation, acceptable and nasal cannula  Hydration status: acceptable

## 2019-11-06 NOTE — H&P
Pre-Op H&P  Anne Marie May  [id number]  1980    Chief complaint: Left hip and leg pain    HPI:    Patient is a 39 y.o.female who developed back pain radiating to her left hip and leg about 4 months ago. She has been treated with physical therapy and lumbar injections without improvement, and in fact in the past week has developed a much more extreme level of pain.  Her MRI scan lumbar spine was done on 8/8/2019 and showed a very large central and left paracentral disc herniation at L4-5 compressing the left L5 nerve root.  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  She came to the office urgently today because of a level of pain that exceeded what she had experienced before. Conservative treatment has failed to provide significant relief. Surgical intervention is  recommended and she is agreeable. She is here today for a lumbar minimal access laminotomy discectomy L4-L5 left.    Review of Systems:  General ROS: negative for chills, fever or skin lesions;  No changes since last office visit  Cardiovascular ROS: no chest pain or dyspnea on exertion  Respiratory ROS: no cough, shortness of breath, or wheezing; former cigarette smoker x 5 years, quit 1/1/97    Allergies: No Known Allergies    Home Meds:    No current facility-administered medications on file prior to encounter.      Current Outpatient Medications on File Prior to Encounter   Medication Sig Dispense Refill   • docusate sodium (COLACE) 100 MG capsule Take 100 mg by mouth 2 (Two) Times a Day.     • ibuprofen (ADVIL,MOTRIN) 800 MG tablet Take 800 mg by mouth Every 6 (Six) Hours As Needed for Mild Pain .     • traMADol (ULTRAM) 50 MG tablet Take 50 mg by mouth Every 6 (Six) Hours As Needed for Moderate Pain  (TAKES ONLY IF PAIN IS INTOLERANT-).         PMH:   Past Medical History:   Diagnosis Date   • Anemia     iron deficiency   • History of transfusion 2014 AND 2015   •  "Iron deficiency anemia due to chronic blood loss    • Uterine fibroid    • Vitamin D deficiency      PSH:    Past Surgical History:   Procedure Laterality Date   • COLONOSCOPY     • MOUTH SURGERY      wisdom teeth   • MYOMECTOMY VAGINAL APPROACH N/A 2016   • WISDOM TOOTH EXTRACTION         Social History:   Tobacco:   Social History     Tobacco Use   Smoking Status Former Smoker   • Years: 5.00   • Start date: 1997   • Last attempt to quit: 10/15/2012   • Years since quittin.0   Smokeless Tobacco Never Used      Alcohol:     Social History     Substance and Sexual Activity   Alcohol Use Yes   • Types: 2 Glasses of wine, 2 Cans of beer per week       Vitals:           /98 (BP Location: Right arm, Patient Position: Lying)   Pulse 119   Temp 98.2 °F (36.8 °C) (Temporal)   Resp 16   Ht 167 cm (65.75\")   Wt 121 kg (266 lb)   LMP 10/23/2019   SpO2 98%   BMI 43.26 kg/m²     Physical Exam:  General Appearance:    Alert, cooperative, no distress, appears stated age   Head:    Normocephalic, without obvious abnormality, atraumatic   Lungs:     Clear to auscultation bilaterally, respirations unlabored    Heart:   Regular rate and rhythm, S1 and S2 normal, no murmur, rub    or gallop    Abdomen:    Soft, non-tender, +bowel sounds   Breast Exam:    deferred   Genitalia:    deferred   Extremities:   Extremities normal, atraumatic, no cyanosis or edema   Skin:   Skin color, texture, turgor normal, no rashes or lesions   Neurologic:   Grossly intact   Results Review  LABS:  Lab Results   Component Value Date    WBC 7.15 10/28/2019    HGB 11.4 (L) 10/28/2019    HCT 37.4 10/28/2019    MCV 87.2 10/28/2019     10/28/2019    NEUTROABS 3.94 2019    GLUCOSE 102 (H) 10/28/2019    BUN 9 10/28/2019    CREATININE 0.61 10/28/2019    EGFRIFNONA 108 10/28/2019     10/28/2019    K 4.0 10/28/2019     10/28/2019    CO2 26.0 10/28/2019    CALCIUM 9.4 10/28/2019    ALBUMIN 4.42 2019    AST 27 " 02/11/2019    ALT 17 02/11/2019    BILITOT 0.4 02/11/2019       RADIOLOGY:  Imaging Results (Last 72 Hours)     ** No results found for the last 72 hours. **          I reviewed the patient's new clinical results.    Cancer Staging (if applicable)  Cancer Patient: __ yes _X_no __unknown; If yes, clinical stage T:__ N:__M:__, stage group or __N/A    Impression: Herniated disc L4-5 left paracentral, left L5 radiculopathy    Plan: Minimal access laminotomy discectomy L4-L5 left      Christen Aldana, APRN   11/6/2019   10:51 AM

## 2019-11-06 NOTE — OP NOTE
OPERATIVE REPORT    DATE OF OPERATION: 11/6/2019    PREOPERATIVE DIAGNOSIS: Central disc herniation L4-5    POSTOPERATIVE DIAGNOSIS: Same    PROCEDURE PERFORMED: Left L4-5 laminectomy and discectomy    SURGEON: Jeff Bright MD    ASSISTANT: Sofi Kay PA-C    INDICATIONS: The patient developed back pain radiating to the left hip and leg.  Lumbar MRI scan in August showed a left paracentral disc herniation at L4-5.  In October pain increased dramatically and shifted to her right leg.  MRI scan showed a massive central disc herniation at developed through the central disc at L4-5.  Discectomy was elected.    DESCRIPTON OF PROCEDURE: Surgery was performed under general anesthesia in the prone position on the laminectomy frame.  The back was prepared sterilely and draped.  The left lower L4 lamina was identified with a spinal needle and an AP fluoroscopic image.  A short skin incision was made and a guidewire and dilators were used to place a 8 cm length x 22 mm width tubular retractor.  AP fluoroscopy confirmed this to be the L4-5 level on the left.    A high-speed drill was used to perform a laminectomy beginning on the left side extending into the lateral recess and then extending the drilling of the lower lamina of L4 across the midline to the right side toward but not as far as the lateral recess.  A 3 mm punch was then used to remove the exposed ligamentum flavum at the midline and extending the removal into the lateral recess on the left side until the floor of the lateral recess and the L5 nerve root could be seen.  Epidural veins were coagulated with bipolar and divided with microscissors.  The dura and nerve root could not be retracted medially because of the size of the central disc herniation.  #15 scalpel blade was used to open the disc lateral to the nerve root sleeve of L5 giving entry to the central portion of the disc.  Straight micropituitary forcep was then used to begin disc removal of the  remaining nuclear portion and reaching more medially the subligamentous component of the disc herniation.  And upward angled micropituitary forcep was used to continue removal of the more medial lying fragments which were taken out piecemeal from the subligamentous space.    At this point a nerve root retractor could be placed as the tension on the dura and nerve root had been relieved significantly the dura was retracted medially enough to find an edge of the extruded free fragment which was extracted and a single large piece with micropituitary forcep.  This seemed to completely loosen the dura from any residual tension.  Medium straight and upward pituitary forceps were used to continue piecemeal removal of nuclear fragments, but no other significant extruded fragment was found.    The nerve root retractor was removed and the laminectomy site was irrigated to confirm complete hemostasis. Gelfoam with Depo-Medrol was placed over the laminectomy site.  The tubular retractor was removed.  Marcaine was injected in the paraspinous muscle.  Subcuticular Vicryl closure was performed and glue was applied to the skin.  Blood loss was estimated at 40 mL.    Jeff Bright M.D.

## 2019-11-06 NOTE — ANESTHESIA PROCEDURE NOTES
Airway  Urgency: elective    Date/Time: 11/6/2019 12:27 PM  Airway not difficult    General Information and Staff    Patient location during procedure: OR  CRNA: Debbie Wang CRNA    Indications and Patient Condition  Indications for airway management: airway protection    Preoxygenated: yes  MILS not maintained throughout  Mask difficulty assessment: 1 - vent by mask    Final Airway Details  Final airway type: endotracheal airway      Successful airway: ETT  Cuffed: yes   Successful intubation technique: direct laryngoscopy  Facilitating devices/methods: intubating stylet  Endotracheal tube insertion site: oral  Blade: Pankaj  Blade size: 3  ETT size (mm): 7.0  Cormack-Lehane Classification: grade I - full view of glottis  Placement verified by: chest auscultation and capnometry   Measured from: lips  ETT/EBT  to lips (cm): 20  Number of attempts at approach: 1  Assessment: lips, teeth, and gum same as pre-op and atraumatic intubation    Additional Comments  Negative epigastric sounds, Breath sound equal bilaterally with symmetric chest rise and fall

## 2019-11-06 NOTE — ANESTHESIA PREPROCEDURE EVALUATION
Anesthesia Evaluation     Patient summary reviewed and Nursing notes reviewed                Airway   Mallampati: II  TM distance: >3 FB  Neck ROM: full  No difficulty expected  Dental - normal exam     Pulmonary - normal exam   (+) a smoker (quit 7 years ago) Former,   Cardiovascular - negative cardio ROS and normal exam        Neuro/Psych- negative ROS  GI/Hepatic/Renal/Endo    (+) morbid obesity,      Musculoskeletal (-) negative ROS    Abdominal  - normal exam    Bowel sounds: normal.   Substance History - negative use     OB/GYN negative ob/gyn ROS         Other                        Anesthesia Plan    ASA 3     general     intravenous induction     Anesthetic plan, all risks, benefits, and alternatives have been provided, discussed and informed consent has been obtained with: patient.    Plan discussed with CRNA.

## 2019-11-06 NOTE — BRIEF OP NOTE
LUMBAR DISCECTOMY MICRO ENDOSCOPIC  Progress Note    Anne Marie May  11/6/2019    Pre-op Diagnosis:   Herniation of intervertebral disc of lumbar spine [M51.26]  Degenerative lumbar disc [M51.36]       Post-Op Diagnosis Codes:     * Herniation of intervertebral disc of lumbar spine [M51.26]     * Degenerative lumbar disc [M51.36]    Procedure/CPT® Codes:      Procedure(s):  LUMBAR DISCECTOMY LAMINOTOMY  L4-5 LEFT    Surgeon(s):  Jeff Bright MD    Anesthesia: General    Staff:   Circulator: Paola Castillo RN  Scrub Person: Josefa Wagner Tia  Nursing Assistant: Verito Hensley  Assistant: Shannon Kay PA-C    Estimated Blood Loss: minimal    Urine Voided: * No values recorded between 11/6/2019 12:18 PM and 11/6/2019  1:45 PM *    Specimens:                None          Drains:      Findings: Central disc herniation L4-5    Complications: None      Jeff Bright MD     Date: 11/6/2019  Time: 1:45 PM

## 2019-11-06 NOTE — ANESTHESIA POSTPROCEDURE EVALUATION
Patient: Anne Marie May    Procedure Summary     Date:  11/06/19 Room / Location:   [facility] /  [facility]    Anesthesia Start:  1218 Anesthesia Stop:  1410    Procedure:  LUMBAR DISCECTOMY LAMINOTOMY  L4-5 LEFT (Left Spine Lumbar) Diagnosis:       Herniation of intervertebral disc of lumbar spine      Degenerative lumbar disc      (Herniation of intervertebral disc of lumbar spine [M51.26])      (Degenerative lumbar disc [M51.36])    Surgeon:  Jeff Bright MD Provider:  Musa Cramer MD    Anesthesia Type:  general ASA Status:  3          Anesthesia Type: general  Last vitals  BP   139/98 (11/06/19 1030)   Temp   98.2 °F (36.8 °C) (11/06/19 1030)   Pulse   119 (11/06/19 1030)   Resp   16 (11/06/19 1030)     SpO2   98 % (11/06/19 1030)     Post Anesthesia Care and Evaluation    Patient location during evaluation: PACU  Patient participation: complete - patient participated  Level of consciousness: awake and alert  Pain score: 0  Pain management: adequate  Airway patency: patent  Anesthetic complications: No anesthetic complications  PONV Status: none  Cardiovascular status: hemodynamically stable and acceptable  Respiratory status: nonlabored ventilation, acceptable and nasal cannula  Hydration status: acceptable

## 2019-11-19 ENCOUNTER — OFFICE VISIT (OUTPATIENT)
Dept: INTERNAL MEDICINE | Facility: CLINIC | Age: 39
End: 2019-11-19

## 2019-11-19 VITALS
HEART RATE: 76 BPM | TEMPERATURE: 99.1 F | RESPIRATION RATE: 18 BRPM | DIASTOLIC BLOOD PRESSURE: 94 MMHG | BODY MASS INDEX: 43.59 KG/M2 | SYSTOLIC BLOOD PRESSURE: 138 MMHG | WEIGHT: 268 LBS

## 2019-11-19 DIAGNOSIS — D50.9 IRON DEFICIENCY ANEMIA, UNSPECIFIED IRON DEFICIENCY ANEMIA TYPE: ICD-10-CM

## 2019-11-19 DIAGNOSIS — Z23 IMMUNIZATION DUE: ICD-10-CM

## 2019-11-19 DIAGNOSIS — I10 ESSENTIAL HYPERTENSION: Primary | ICD-10-CM

## 2019-11-19 LAB
ANION GAP SERPL CALCULATED.3IONS-SCNC: 10.9 MMOL/L (ref 5–15)
BASOPHILS # BLD AUTO: 0.05 10*3/MM3 (ref 0–0.2)
BASOPHILS NFR BLD AUTO: 0.5 % (ref 0–1.5)
BUN BLD-MCNC: 9 MG/DL (ref 6–20)
BUN/CREAT SERPL: 14.5 (ref 7–25)
CALCIUM SPEC-SCNC: 9 MG/DL (ref 8.6–10.5)
CHLORIDE SERPL-SCNC: 103 MMOL/L (ref 98–107)
CO2 SERPL-SCNC: 23.1 MMOL/L (ref 22–29)
CREAT BLD-MCNC: 0.62 MG/DL (ref 0.57–1)
DEPRECATED RDW RBC AUTO: 42.4 FL (ref 37–54)
EOSINOPHIL # BLD AUTO: 0.13 10*3/MM3 (ref 0–0.4)
EOSINOPHIL NFR BLD AUTO: 1.4 % (ref 0.3–6.2)
ERYTHROCYTE [DISTWIDTH] IN BLOOD BY AUTOMATED COUNT: 13.5 % (ref 12.3–15.4)
FERRITIN SERPL-MCNC: 7.46 NG/ML (ref 13–150)
GFR SERPL CREATININE-BSD FRML MDRD: 106 ML/MIN/1.73
GLUCOSE BLD-MCNC: 99 MG/DL (ref 65–99)
HCT VFR BLD AUTO: 33.9 % (ref 34–46.6)
HGB BLD-MCNC: 10.8 G/DL (ref 12–15.9)
IMM GRANULOCYTES # BLD AUTO: 0.04 10*3/MM3 (ref 0–0.05)
IMM GRANULOCYTES NFR BLD AUTO: 0.4 % (ref 0–0.5)
IRON 24H UR-MRATE: 31 MCG/DL (ref 37–145)
IRON SATN MFR SERPL: 6 % (ref 20–50)
LYMPHOCYTES # BLD AUTO: 1.61 10*3/MM3 (ref 0.7–3.1)
LYMPHOCYTES NFR BLD AUTO: 17.3 % (ref 19.6–45.3)
MCH RBC QN AUTO: 27.3 PG (ref 26.6–33)
MCHC RBC AUTO-ENTMCNC: 31.9 G/DL (ref 31.5–35.7)
MCV RBC AUTO: 85.8 FL (ref 79–97)
MONOCYTES # BLD AUTO: 0.66 10*3/MM3 (ref 0.1–0.9)
MONOCYTES NFR BLD AUTO: 7.1 % (ref 5–12)
NEUTROPHILS # BLD AUTO: 6.81 10*3/MM3 (ref 1.7–7)
NEUTROPHILS NFR BLD AUTO: 73.3 % (ref 42.7–76)
NRBC BLD AUTO-RTO: 0 /100 WBC (ref 0–0.2)
PLATELET # BLD AUTO: 445 10*3/MM3 (ref 140–450)
PMV BLD AUTO: 11.1 FL (ref 6–12)
POTASSIUM BLD-SCNC: 4 MMOL/L (ref 3.5–5.2)
RBC # BLD AUTO: 3.95 10*6/MM3 (ref 3.77–5.28)
SODIUM BLD-SCNC: 137 MMOL/L (ref 136–145)
TIBC SERPL-MCNC: 510 MCG/DL (ref 298–536)
TRANSFERRIN SERPL-MCNC: 342 MG/DL (ref 200–360)
WBC NRBC COR # BLD: 9.3 10*3/MM3 (ref 3.4–10.8)

## 2019-11-19 PROCEDURE — 84466 ASSAY OF TRANSFERRIN: CPT | Performed by: INTERNAL MEDICINE

## 2019-11-19 PROCEDURE — 99213 OFFICE O/P EST LOW 20 MIN: CPT | Performed by: INTERNAL MEDICINE

## 2019-11-19 PROCEDURE — 82728 ASSAY OF FERRITIN: CPT | Performed by: INTERNAL MEDICINE

## 2019-11-19 PROCEDURE — 83540 ASSAY OF IRON: CPT | Performed by: INTERNAL MEDICINE

## 2019-11-19 PROCEDURE — 90686 IIV4 VACC NO PRSV 0.5 ML IM: CPT | Performed by: INTERNAL MEDICINE

## 2019-11-19 PROCEDURE — 90471 IMMUNIZATION ADMIN: CPT | Performed by: INTERNAL MEDICINE

## 2019-11-19 PROCEDURE — 85025 COMPLETE CBC W/AUTO DIFF WBC: CPT | Performed by: INTERNAL MEDICINE

## 2019-11-19 PROCEDURE — 36415 COLL VENOUS BLD VENIPUNCTURE: CPT | Performed by: INTERNAL MEDICINE

## 2019-11-19 PROCEDURE — 80048 BASIC METABOLIC PNL TOTAL CA: CPT | Performed by: INTERNAL MEDICINE

## 2019-11-19 RX ORDER — LOSARTAN POTASSIUM 50 MG/1
50 TABLET ORAL DAILY
Qty: 30 TABLET | Refills: 2 | Status: SHIPPED | OUTPATIENT
Start: 2019-11-19 | End: 2020-01-20 | Stop reason: SDUPTHER

## 2019-11-19 NOTE — PROGRESS NOTES
Chief Complaint   Patient presents with   • Follow-up     Follow up chronic medical problems       History of Present Illness    The patient presents for a follow-up related to hypertension. The patient reports that she has had no headaches, chest pain, dyspnea, edema, syncope, blurred vision or palpitations. She states that she does not take medication. She has recently had back surgery and is taking ibuprofen for pain control.    The patient presents for a follow-up related to anemia. There are no reports of blood loss. The patient has no symptoms of a dry cough, a wet cough, wheezing, fever, nausea, vomiting, diarrhea, myalgias, abdominal pain, sweats, weight loss, decreased appetite, chills, fatigue or paresthesias. The patient's energy level is normal.    Review of Systems    PULMONARY- Denies Sputum Production, Cough, Hemoptysis or Pleuritic Chest Pain.    CARDIOVASCULAR- Denies Claudication or Irregular Heart Beat.    Medications      Current Outpatient Medications:   •  docusate sodium (COLACE) 100 MG capsule, Take 100 mg by mouth 2 (Two) Times a Day., Disp: , Rfl:   •  ibuprofen (ADVIL,MOTRIN) 800 MG tablet, Take 800 mg by mouth Every 6 (Six) Hours As Needed for Mild Pain ., Disp: , Rfl:   •  losartan (COZAAR) 50 MG tablet, Take 1 tablet by mouth Daily., Disp: 30 tablet, Rfl: 2     Allergies    No Known Allergies    Problem List    Patient Active Problem List   Diagnosis   • Iron deficiency anemia   • Adverse effect of iron or its compound   • Vitamin D deficiency   • Health care maintenance   • Acute midline low back pain with left-sided sciatica   • Herniation of intervertebral disc of lumbar spine   • Degenerative lumbar disc       Medications, Allergies, Problems List and Past History were reviewed and updated.    Physical Examination    /94 (BP Location: Left arm, Patient Position: Sitting, Cuff Size: Adult)   Pulse 76   Temp 99.1 °F (37.3 °C) (Temporal)   Resp 18   Wt 122 kg (268 lb)   LMP  11/18/2019 (Exact Date)   Breastfeeding? No   BMI 43.59 kg/m²     HEENT: Facies- Within normal limits. Lids- Normal bilaterally. Conjunctiva- Clear bilaterally. Sclera- Anicteric bilaterally.    Neck: Thyroid- non enlarged, symmetric and has no nodules. No bruits are detected.    Lungs: Auscultation- Clear to auscultation bilaterally. There are no retractions, clubbing or cyanosis. The Expiratory to Inspiratory ratio is equal.    Cardiovascular: Heart- Normal Rate with Regular rhythm and no murmurs.    Impression and Assessment    Essential Hypertension.    Iron Deficiency Anemia.    Plan    Essential Hypertension Plan: Medication was added as noted below.    Iron Deficiency Anemia Plan: Further plans will be made after results of tests are available.    Anne Marie was seen today for follow-up.    Diagnoses and all orders for this visit:    Essential hypertension  -     losartan (COZAAR) 50 MG tablet; Take 1 tablet by mouth Daily.    Iron deficiency anemia, unspecified iron deficiency anemia type  -     CBC & Differential  -     Basic Metabolic Panel  -     Ferritin  -     Iron Profile          Return to Office    The patient was instructed to return for follow-up in 1 month. Next Visit: Recheck Blood Pressure.    The patient was instructed to return sooner if the condition changes, worsens, or does not resolve.

## 2019-12-01 DIAGNOSIS — D50.9 IRON DEFICIENCY ANEMIA, UNSPECIFIED IRON DEFICIENCY ANEMIA TYPE: Primary | ICD-10-CM

## 2019-12-02 ENCOUNTER — OFFICE VISIT (OUTPATIENT)
Dept: NEUROSURGERY | Facility: CLINIC | Age: 39
End: 2019-12-02

## 2019-12-02 ENCOUNTER — TELEPHONE (OUTPATIENT)
Dept: INTERNAL MEDICINE | Facility: CLINIC | Age: 39
End: 2019-12-02

## 2019-12-02 VITALS
TEMPERATURE: 97.6 F | HEIGHT: 66 IN | SYSTOLIC BLOOD PRESSURE: 144 MMHG | WEIGHT: 270 LBS | BODY MASS INDEX: 43.39 KG/M2 | DIASTOLIC BLOOD PRESSURE: 100 MMHG

## 2019-12-02 DIAGNOSIS — M51.26 HERNIATION OF INTERVERTEBRAL DISC OF LUMBAR SPINE: Primary | ICD-10-CM

## 2019-12-02 DIAGNOSIS — M54.42 ACUTE MIDLINE LOW BACK PAIN WITH LEFT-SIDED SCIATICA: ICD-10-CM

## 2019-12-02 PROCEDURE — 99024 POSTOP FOLLOW-UP VISIT: CPT | Performed by: PHYSICIAN ASSISTANT

## 2019-12-02 NOTE — TELEPHONE ENCOUNTER
Patient was calling back to return Iliana's call but she also had some follow up questions regarding some further testing please call her back [phone]

## 2019-12-02 NOTE — TELEPHONE ENCOUNTER
See results note:    Notes recorded by Iliana Ny, RN on 12/2/2019 at 5:39 PM EST  [facility] for pt to please return call.  Ofc # given.  ------    Notes recorded by Nic Greer MD on 12/2/2019 at 1:16 PM EST  Yes.  I have reviewed all of those, including the hematology evaluation.  Given that it's been over 5 years, I would recommend a repeat.  Nic Greer MD  1:15 PM  12/02/19    ------    Notes recorded by Iliana Ny, RN on 12/2/2019 at 10:20 AM EST  S/W pt, reveiwed lab letter, results and recommendations.  Verb understanding and agreement.  States she had normal EGD and colonoscopy in 2014 when she had anemia and just wants to confirm still needs to have done and if so she would really like to have done before the end of the year.  Also wants to know is she needs to start an iron supplement?  ------    Notes recorded by Nic Greer MD on 12/1/2019 at 7:40 PM EST  Please call patient with lab results.  See letter in chart.  Orders entered.  Nic Greer MD  7:36 PM  12/01/19

## 2019-12-02 NOTE — PROGRESS NOTES
Patient: Anne Marie May  : 1980  Gender: female    Primary Care Provider: Nic Greer MD      Chief Complaint:   Chief Complaint   Patient presents with   • Back Pain       History of Present Illness:  Anne Marie May is a 39-year-old female who presented with back and left hip and leg pain in August of this year.  MRI revealed a left paracentral disc herniation at L4-5.  She attempted conservative treatment until October when pain increased dramatically and transition to the right leg as well.  Repeat MRI revealed a massive central disc herniation at L4-5 therefore minimal access discectomy was elected.    Patient presents today 4 weeks postop.  She reports complete resolution of back and lower extremity symptoms immediately postop.  She has occasional paresthesias of the left foot which is intermittent.  She is taking no pain medication or muscle relaxers at this time.  She is ambulating much more efficiently.  Overall she is doing well and pleased with her surgical outcome.      Past Medical and Surgical History:  Past Medical History:   Diagnosis Date   • Anemia     iron deficiency   • History of transfusion  AND    • Iron deficiency anemia due to chronic blood loss    • Uterine fibroid    • Vitamin D deficiency      Past Surgical History:   Procedure Laterality Date   • COLONOSCOPY     • LUMBAR DISCECTOMY Left 2019    Procedure: LUMBAR DISCECTOMY LAMINOTOMY L4-5 LEFT;  Surgeon: Jeff Bright MD;  Location: [facility];  Service: Neurosurgery   • MOUTH SURGERY      wisdom teeth   • MYOMECTOMY VAGINAL APPROACH N/A 2016   • WISDOM TOOTH EXTRACTION         Current Medications:    Current Outpatient Medications:   •  docusate sodium (COLACE) 100 MG capsule, Take 100 mg by mouth 2 (Two) Times a Day., Disp: , Rfl:   •  losartan (COZAAR) 50 MG tablet, Take 1 tablet by mouth Daily., Disp: 30 tablet, Rfl: 2    Allergies:  No Known Allergies      Review of Systems    Constitutional: Negative.  Negative for activity change, appetite change, chills, diaphoresis, fatigue, fever, unexpected weight gain and unexpected weight loss.   HENT: Negative.  Negative for congestion, dental problem, drooling, ear discharge, ear pain, facial swelling, hearing loss, mouth sores, nosebleeds, postnasal drip, rhinorrhea, sinus pressure, sneezing, sore throat, tinnitus, trouble swallowing and voice change.    Eyes: Negative.  Negative for blurred vision, double vision, photophobia, pain, discharge, redness, itching and visual disturbance.   Respiratory: Negative.  Negative for apnea, cough, choking, chest tightness, shortness of breath, wheezing and stridor.    Cardiovascular: Negative.  Negative for chest pain, palpitations and leg swelling.   Gastrointestinal: Negative.  Negative for abdominal distention, abdominal pain, anal bleeding, blood in stool, constipation, diarrhea, nausea, rectal pain, vomiting, GERD and indigestion.   Endocrine: Negative.  Negative for cold intolerance, heat intolerance, polydipsia, polyphagia and polyuria.   Genitourinary: Negative.  Negative for breast discharge, breast lump, breast pain, decreased libido, decreased urine volume, difficulty urinating, dyspareunia, dysuria, flank pain, frequency, genital sores, hematuria, menstrual problem, pelvic pain, urgency, urinary incontinence, vaginal bleeding, vaginal discharge and vaginal pain.   Musculoskeletal: Negative.  Negative for arthralgias, back pain, gait problem, joint swelling, myalgias, neck pain, neck stiffness and bursitis.   Skin: Negative.  Negative for color change, dry skin, pallor, rash, skin lesions and bruise.   Allergic/Immunologic: Negative.  Negative for environmental allergies, food allergies and immunocompromised state.   Neurological: Positive for numbness. Negative for dizziness, tremors, seizures, syncope, facial asymmetry, speech difficulty, weakness, light-headedness, headache, memory problem  "and confusion.   Hematological: Negative.  Negative for adenopathy. Does not bruise/bleed easily.   Psychiatric/Behavioral: Negative.  Negative for agitation, behavioral problems, decreased concentration, dysphoric mood, hallucinations, self-injury, sleep disturbance, suicidal ideas, negative for hyperactivity, depressed mood and stress. The patient is not nervous/anxious.    All other systems reviewed and are negative.        Physical Exam  AXOX3  Strength and range of motion intact in lower extremities  Gait is normal  Lumbar incision is well-healed without erythema, fluctuance or drainage      Vitals:    12/02/19 1404   BP: 144/100   BP Location: Left arm   Patient Position: Sitting   Cuff Size: Adult   Temp: 97.6 °F (36.4 °C)   TempSrc: Temporal   Weight: 122 kg (270 lb)   Height: 167 cm (65.75\")       Assessment:  Central disc herniation L4-5  Status post left L4-5 laminectomy and discectomy    Plan:  Ms. May is seen today in a postoperative visit following an uncomplicated minimal access discectomy L4-5 proximally 4 weeks ago.  She is doing very well and reports complete resolution of back and lower extremity pain symptoms since surgery.  She would like to begin physical therapy at this time.  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  I have advised her to not lift greater than 20-30 pounds and avoid repetitive bending, lifting and twisting when she does return.  Patient will contact our office with concerns or worsening symptoms moving forward.  Otherwise she will follow-up on an as-needed basis.    Follow Up:  JOSUÉ Tinoco PA-C  "

## 2019-12-12 ENCOUNTER — OUTSIDE FACILITY SERVICE (OUTPATIENT)
Dept: GASTROENTEROLOGY | Facility: CLINIC | Age: 39
End: 2019-12-12

## 2019-12-12 ENCOUNTER — LAB REQUISITION (OUTPATIENT)
Dept: LAB | Facility: HOSPITAL | Age: 39
End: 2019-12-12

## 2019-12-12 DIAGNOSIS — D64.9 ANEMIA, UNSPECIFIED: ICD-10-CM

## 2019-12-12 PROCEDURE — 43239 EGD BIOPSY SINGLE/MULTIPLE: CPT | Performed by: INTERNAL MEDICINE

## 2019-12-12 PROCEDURE — 88305 TISSUE EXAM BY PATHOLOGIST: CPT | Performed by: INTERNAL MEDICINE

## 2019-12-12 PROCEDURE — 99243 OFF/OP CNSLTJ NEW/EST LOW 30: CPT | Performed by: INTERNAL MEDICINE

## 2019-12-13 LAB
CYTO UR: NORMAL
LAB AP CASE REPORT: NORMAL
LAB AP CLINICAL INFORMATION: NORMAL
PATH REPORT.FINAL DX SPEC: NORMAL
PATH REPORT.GROSS SPEC: NORMAL

## 2019-12-17 ENCOUNTER — OFFICE VISIT (OUTPATIENT)
Dept: INTERNAL MEDICINE | Facility: CLINIC | Age: 39
End: 2019-12-17

## 2019-12-17 VITALS
RESPIRATION RATE: 16 BRPM | OXYGEN SATURATION: 99 % | WEIGHT: 272 LBS | TEMPERATURE: 97 F | DIASTOLIC BLOOD PRESSURE: 82 MMHG | BODY MASS INDEX: 43.71 KG/M2 | HEART RATE: 98 BPM | SYSTOLIC BLOOD PRESSURE: 132 MMHG | HEIGHT: 66 IN

## 2019-12-17 DIAGNOSIS — I10 ESSENTIAL HYPERTENSION: Primary | ICD-10-CM

## 2019-12-17 PROBLEM — M54.50 LOW BACK PAIN: Status: ACTIVE | Noted: 2019-08-01

## 2019-12-17 PROCEDURE — 99213 OFFICE O/P EST LOW 20 MIN: CPT | Performed by: PHYSICIAN ASSISTANT

## 2019-12-17 NOTE — PROGRESS NOTES
Chief Complaint   Patient presents with   • Hypertension     4 week F/U       Subjective       History of Present Illness     Anne Marie May is a 39 y.o. female. She presents for follow up of HTN. Pt is taking newly prescribed losartan as directed without any s/e. She denies chest pain, SOA, HA, vision change, swelling of extremities. She is not checking her BP at home. She has tried to eat healthier and reduced sodium in her diet. She has no new concerns today.     The following portions of the patient's history were reviewed and updated as appropriate: allergies, current medications, past medical history and problem list.    No Known Allergies  Social History     Tobacco Use   • Smoking status: Former Smoker     Packs/day: 0.00     Years: 5.00     Pack years: 0.00     Start date: 1997     Last attempt to quit: 10/15/2012     Years since quittin.1   • Smokeless tobacco: Never Used   Substance Use Topics   • Alcohol use: Yes     Alcohol/week: 3.0 standard drinks     Types: 3 Glasses of wine per week         Current Outpatient Medications:   •  docusate sodium (COLACE) 100 MG capsule, Take 100 mg by mouth 2 (Two) Times a Day., Disp: , Rfl:   •  losartan (COZAAR) 50 MG tablet, Take 1 tablet by mouth Daily., Disp: 30 tablet, Rfl: 2    Review of Systems   Constitutional: Negative for chills, fatigue, fever, unexpected weight gain and unexpected weight loss.   HENT: Negative for ear pain and sore throat.    Eyes: Negative for blurred vision.   Respiratory: Negative for cough, shortness of breath and wheezing.    Cardiovascular: Negative for chest pain, palpitations and leg swelling.   Gastrointestinal: Negative for abdominal pain, nausea and vomiting.   Genitourinary: Negative for dysuria.   Skin: Negative for rash.   Neurological: Negative for dizziness and headache.       Objective   Vitals:    19 0920 19 0934   BP: 138/82 132/82   BP Location: Left arm    Patient Position: Sitting    Cuff  "Size: Adult    Pulse: 98    Resp: 16    Temp: 97 °F (36.1 °C)    TempSrc: Temporal    SpO2: 99%    Weight: 123 kg (272 lb)    Height: 167 cm (65.75\")      BP recheck (mine): 132/82    Physical Exam   Constitutional: She appears well-developed and well-nourished.   HENT:   Head: Normocephalic and atraumatic.   Right Ear: Tympanic membrane, external ear and ear canal normal.   Left Ear: Tympanic membrane, external ear and ear canal normal.   Mouth/Throat: Oropharynx is clear and moist and mucous membranes are normal.   Eyes: Conjunctivae are normal.   Neck: Carotid bruit is not present.   Cardiovascular: Normal rate, regular rhythm and intact distal pulses.   No murmur heard.  No peripheral edema noted.    Pulmonary/Chest: Effort normal and breath sounds normal. She has no wheezes. She has no rales.   Lymphadenopathy:     She has no cervical adenopathy.   Psychiatric: She has a normal mood and affect. Her behavior is normal.             Assessment/Plan   Anne Marie was seen today for hypertension.    Diagnoses and all orders for this visit:    Essential hypertension  - Continue losartan as directed.     Advised pt to check BP at home or pharmacy at least once a week, record in log, bring to next visit for review with PCP.            Return for Next scheduled follow up.  "

## 2020-01-01 NOTE — TELEPHONE ENCOUNTER
No answer, left . Told patient her iron levels are low again. Dr. Quach would like for her to try taking FeSO4 325 mg once daily, and recheck labs in 2 months. Advised no apt needed for our clinic lab, just stop by any time during business hours and the order will be in epic. Suggested she try a stool softener, such as docusate sodium, along with the iron to prevent any abdominal issues. Call clinic back with questions.    52.5

## 2020-01-20 DIAGNOSIS — I10 ESSENTIAL HYPERTENSION: ICD-10-CM

## 2020-01-20 RX ORDER — LOSARTAN POTASSIUM 50 MG/1
50 TABLET ORAL DAILY
Qty: 90 TABLET | Refills: 1 | Status: SHIPPED | OUTPATIENT
Start: 2020-01-20 | End: 2020-06-30

## 2020-02-27 ENCOUNTER — OFFICE VISIT (OUTPATIENT)
Dept: INTERNAL MEDICINE | Facility: CLINIC | Age: 40
End: 2020-02-27

## 2020-02-27 VITALS
BODY MASS INDEX: 43.59 KG/M2 | DIASTOLIC BLOOD PRESSURE: 94 MMHG | HEART RATE: 76 BPM | TEMPERATURE: 98.5 F | RESPIRATION RATE: 18 BRPM | SYSTOLIC BLOOD PRESSURE: 136 MMHG | WEIGHT: 268 LBS

## 2020-02-27 DIAGNOSIS — I10 ESSENTIAL HYPERTENSION: Primary | ICD-10-CM

## 2020-02-27 DIAGNOSIS — D50.9 IRON DEFICIENCY ANEMIA, UNSPECIFIED IRON DEFICIENCY ANEMIA TYPE: ICD-10-CM

## 2020-02-27 DIAGNOSIS — J06.9 UPPER RESPIRATORY TRACT INFECTION, UNSPECIFIED TYPE: ICD-10-CM

## 2020-02-27 LAB
ANION GAP SERPL CALCULATED.3IONS-SCNC: 13.9 MMOL/L (ref 5–15)
BASOPHILS # BLD AUTO: 0.04 10*3/MM3 (ref 0–0.2)
BASOPHILS NFR BLD AUTO: 0.5 % (ref 0–1.5)
BUN BLD-MCNC: 11 MG/DL (ref 6–20)
BUN/CREAT SERPL: 19.6 (ref 7–25)
CALCIUM SPEC-SCNC: 10 MG/DL (ref 8.6–10.5)
CHLORIDE SERPL-SCNC: 101 MMOL/L (ref 98–107)
CO2 SERPL-SCNC: 24.1 MMOL/L (ref 22–29)
CREAT BLD-MCNC: 0.56 MG/DL (ref 0.57–1)
DEPRECATED RDW RBC AUTO: 51.1 FL (ref 37–54)
EOSINOPHIL # BLD AUTO: 0.11 10*3/MM3 (ref 0–0.4)
EOSINOPHIL NFR BLD AUTO: 1.4 % (ref 0.3–6.2)
ERYTHROCYTE [DISTWIDTH] IN BLOOD BY AUTOMATED COUNT: 16.5 % (ref 12.3–15.4)
FERRITIN SERPL-MCNC: 29.4 NG/ML (ref 13–150)
GFR SERPL CREATININE-BSD FRML MDRD: 119 ML/MIN/1.73
GLUCOSE BLD-MCNC: 90 MG/DL (ref 65–99)
HCT VFR BLD AUTO: 36.9 % (ref 34–46.6)
HGB BLD-MCNC: 12.5 G/DL (ref 12–15.9)
IMM GRANULOCYTES # BLD AUTO: 0.03 10*3/MM3 (ref 0–0.05)
IMM GRANULOCYTES NFR BLD AUTO: 0.4 % (ref 0–0.5)
IRON 24H UR-MRATE: 85 MCG/DL (ref 37–145)
IRON SATN MFR SERPL: 20 % (ref 20–50)
LYMPHOCYTES # BLD AUTO: 1.92 10*3/MM3 (ref 0.7–3.1)
LYMPHOCYTES NFR BLD AUTO: 24.9 % (ref 19.6–45.3)
MCH RBC QN AUTO: 29.3 PG (ref 26.6–33)
MCHC RBC AUTO-ENTMCNC: 33.9 G/DL (ref 31.5–35.7)
MCV RBC AUTO: 86.6 FL (ref 79–97)
MONOCYTES # BLD AUTO: 0.73 10*3/MM3 (ref 0.1–0.9)
MONOCYTES NFR BLD AUTO: 9.5 % (ref 5–12)
NEUTROPHILS # BLD AUTO: 4.87 10*3/MM3 (ref 1.7–7)
NEUTROPHILS NFR BLD AUTO: 63.3 % (ref 42.7–76)
NRBC BLD AUTO-RTO: 0 /100 WBC (ref 0–0.2)
PLATELET # BLD AUTO: 321 10*3/MM3 (ref 140–450)
PMV BLD AUTO: 11.1 FL (ref 6–12)
POTASSIUM BLD-SCNC: 4.4 MMOL/L (ref 3.5–5.2)
RBC # BLD AUTO: 4.26 10*6/MM3 (ref 3.77–5.28)
SODIUM BLD-SCNC: 139 MMOL/L (ref 136–145)
TIBC SERPL-MCNC: 431 MCG/DL (ref 298–536)
TRANSFERRIN SERPL-MCNC: 289 MG/DL (ref 200–360)
WBC NRBC COR # BLD: 7.7 10*3/MM3 (ref 3.4–10.8)

## 2020-02-27 PROCEDURE — 85025 COMPLETE CBC W/AUTO DIFF WBC: CPT | Performed by: INTERNAL MEDICINE

## 2020-02-27 PROCEDURE — 36415 COLL VENOUS BLD VENIPUNCTURE: CPT | Performed by: INTERNAL MEDICINE

## 2020-02-27 PROCEDURE — 80048 BASIC METABOLIC PNL TOTAL CA: CPT | Performed by: INTERNAL MEDICINE

## 2020-02-27 PROCEDURE — 83540 ASSAY OF IRON: CPT | Performed by: INTERNAL MEDICINE

## 2020-02-27 PROCEDURE — 99214 OFFICE O/P EST MOD 30 MIN: CPT | Performed by: INTERNAL MEDICINE

## 2020-02-27 PROCEDURE — 82728 ASSAY OF FERRITIN: CPT | Performed by: INTERNAL MEDICINE

## 2020-02-27 PROCEDURE — 84466 ASSAY OF TRANSFERRIN: CPT | Performed by: INTERNAL MEDICINE

## 2020-02-27 RX ORDER — FERROUS SULFATE 325(65) MG
325 TABLET ORAL EVERY OTHER DAY
COMMUNITY
End: 2022-11-16

## 2020-02-27 RX ORDER — TRIAMTERENE AND HYDROCHLOROTHIAZIDE 37.5; 25 MG/1; MG/1
1 CAPSULE ORAL EVERY MORNING
Qty: 30 CAPSULE | Refills: 2 | Status: SHIPPED | OUTPATIENT
Start: 2020-02-27 | End: 2020-05-31 | Stop reason: SDUPTHER

## 2020-02-27 RX ORDER — ASCORBIC ACID 1000 MG
1 TABLET, EXTENDED RELEASE ORAL DAILY
COMMUNITY
End: 2020-07-27

## 2020-02-27 NOTE — PROGRESS NOTES
Chief Complaint   Patient presents with   • Follow-up     3 month follow up chronic medical problems       History of Present Illness      The patient presents for a follow-up related to hypertension. The patient reports that she has had no headaches, chest pain, dyspnea, edema, syncope, blurred vision or palpitations. She states that she is taking her medication as prescribed. She is not having medication side effects.    The patient presents for a follow-up related to anemia. There are no reports of blood loss. The patient has a dry cough but does not have a wet cough, wheezing, fever, nausea, vomiting, diarrhea, myalgias, abdominal pain, sweats, weight loss, decreased appetite, chills or fatigue. The patient's energy level is normal. She hasn't had the colonoscopy but is going to call and schedule it.    The patient reports a 3 week history of upper respiratory symptoms. The patient does not have facial pain, tooth pain, eye drainage, ear pain, ear drainage, nasal congestion, rash, rhinorrhea or sore throat. The patient has not tried anything for treatment of this illness. She had a URI about 3 weeks ago and the cough has lingered. She otherwise feels well.    Review of Systems    CONSTITUTIONAL- Denies Weakness or Malaise.    PULMONARY- Reports: Cough. Denies: Sputum Production or Hemoptysis.    GASTROINTESTINAL- Denies Blood per Rectum, Constipation or Heartburn.    CARDIOVASCULAR- Denies Claudication or Irregular Heart Beat.    Medications      Current Outpatient Medications:   •  Ascorbic Acid (VITAMIN C ER) 1000 MG tablet controlled-release, Take 1 tablet by mouth Daily., Disp: , Rfl:   •  docusate sodium (COLACE) 100 MG capsule, Take 100 mg by mouth 2 (Two) Times a Day As Needed., Disp: , Rfl:   •  ferrous sulfate 325 (65 FE) MG tablet, Take 325 mg by mouth Daily With Breakfast., Disp: , Rfl:   •  losartan (COZAAR) 50 MG tablet, Take 1 tablet by mouth Daily., Disp: 90 tablet, Rfl: 1     Allergies    No  Known Allergies    Problem List    Patient Active Problem List   Diagnosis   • Iron deficiency anemia   • Adverse effect of iron or its compound   • Vitamin D deficiency   • Health care maintenance   • Low back pain   • Herniation of intervertebral disc of lumbar spine   • Degenerative lumbar disc   • Essential hypertension   • Obesity       Medications, Allergies, Problems List and Past History were reviewed and updated.    Physical Examination    /94 (BP Location: Left arm, Patient Position: Sitting, Cuff Size: Adult)   Pulse 76   Temp 98.5 °F (36.9 °C) (Temporal)   Resp 18   Wt 122 kg (268 lb)   LMP 02/07/2020 (Exact Date)   Breastfeeding No   BMI 43.59 kg/m²     HEENT: Head- Normocephalic Atraumatic. Facies- Within normal limits. Pinnas- Normal texture and shape bilaterally. Canals- Normal bilaterally. TMs- Normal bilaterally. Nares- Patent bilaterally. Nasal Septum- is normal. There is no tenderness to palpation over the frontal or maxillary sinuses. Lids- Normal bilaterally. Conjunctiva- Clear bilaterally. Sclera- Anicteric bilaterally. Oropharynx- Moist with no lesions. Tonsils- No enlargement, erythema or exudate.    Neck: Thyroid- non enlarged, symmetric and has no nodules. No bruits are detected. ROM- Normal Range of Motion with no rigidity.    Lungs: Auscultation- Clear to auscultation bilaterally. There are no retractions, clubbing or cyanosis. The Expiratory to Inspiratory ratio is equal.    Lymph Nodes: Cervical- no enlarged lymph nodes noted.    Cardiovascular: There are no carotid bruits. Heart- Normal Rate with Regular rhythm and no murmurs. There are no gallops. There are no rubs. In the lower extremities there is no edema. The upper extremities do not have edema.    Abdomen: Soft, benign, non-tender with no masses, hernias, organomegaly or scars.    Impression and Assessment    Essential Hypertension.    Iron Deficiency Anemia.    Upper Respiratory Infection.    Plan    Essential  Hypertension Plan: Medication was added as noted below.    Upper Respiratory Infection Plan: Over the counter mucolytic agents were encouraged.    Iron Deficiency Anemia Plan: Further plans will be made after results of tests are available.    Anne Marie was seen today for follow-up.    Diagnoses and all orders for this visit:    Essential hypertension  -     Basic Metabolic Panel  -     triamterene-hydrochlorothiazide (DYAZIDE) 37.5-25 MG per capsule; Take 1 capsule by mouth Every Morning.    Iron deficiency anemia, unspecified iron deficiency anemia type  -     CBC & Differential  -     Ferritin  -     Iron Profile    Upper respiratory tract infection, unspecified type        Return to Office    The patient was instructed to return for follow-up in 6 weeks.    The patient was instructed to return sooner if the condition changes, worsens, or does not resolve.

## 2020-04-13 ENCOUNTER — TELEMEDICINE (OUTPATIENT)
Dept: INTERNAL MEDICINE | Facility: CLINIC | Age: 40
End: 2020-04-13

## 2020-04-13 DIAGNOSIS — D50.9 IRON DEFICIENCY ANEMIA, UNSPECIFIED IRON DEFICIENCY ANEMIA TYPE: ICD-10-CM

## 2020-04-13 DIAGNOSIS — I10 ESSENTIAL HYPERTENSION: Primary | ICD-10-CM

## 2020-04-13 PROCEDURE — 99213 OFFICE O/P EST LOW 20 MIN: CPT | Performed by: INTERNAL MEDICINE

## 2020-04-13 NOTE — PROGRESS NOTES
Chief Complaint   Patient presents with   • Follow-up     You have chosen to receive care through a video visit today. Do you consent to use a video visit for your medical care today? Yes    This was an audio and video enabled telemedicine encounter.    History of Present Illness    The patient presents for a follow-up related to hypertension. The patient reports that she has had no headaches, chest pain, dyspnea, edema, syncope, blurred vision or palpitations. She states that she is taking her medication as prescribed. She is not having medication side effects. She checks her blood pressures at home. The home readings are normal.    The patient presents for a follow-up related to iron deficiency anemia. There are no reports of blood loss. The patient has fatigue but does not have a dry cough, a wet cough, wheezing, fever, nausea, vomiting, diarrhea, myalgias, abdominal pain, sweats, weight loss, decreased appetite or chills. The patient's energy level is normal.    Review of Systems    CONSTITUTIONAL- Denies Weakness.    PULMONARY- Denies Sputum Production, Cough, Hemoptysis or Pleuritic Chest Pain.    CARDIOVASCULAR- Denies Claudication or Irregular Heart Beat.    Medications      Current Outpatient Medications:   •  Ascorbic Acid (VITAMIN C ER) 1000 MG tablet controlled-release, Take 1 tablet by mouth Daily., Disp: , Rfl:   •  docusate sodium (COLACE) 100 MG capsule, Take 100 mg by mouth 2 (Two) Times a Day As Needed., Disp: , Rfl:   •  ferrous sulfate 325 (65 FE) MG tablet, Take 325 mg by mouth Daily With Breakfast., Disp: , Rfl:   •  losartan (COZAAR) 50 MG tablet, Take 1 tablet by mouth Daily., Disp: 90 tablet, Rfl: 1  •  triamterene-hydrochlorothiazide (DYAZIDE) 37.5-25 MG per capsule, Take 1 capsule by mouth Every Morning., Disp: 30 capsule, Rfl: 2     Allergies    No Known Allergies    Problem List    Patient Active Problem List   Diagnosis   • Iron deficiency anemia   • Adverse effect of iron or its  compound   • Vitamin D deficiency   • Health care maintenance   • Low back pain   • Herniation of intervertebral disc of lumbar spine   • Degenerative lumbar disc   • Essential hypertension   • Obesity       Medications, Allergies, Problems List and Past History were reviewed and updated.    Physical Examination    There were no vitals taken for this visit.    HEENT: Facies- Within normal limits. Lids- Normal bilaterally. Conjunctiva- Clear bilaterally. Sclera- Anicteric bilaterally.    Impression and Assessment    Essential Hypertension.    Iron Deficiency Anemia.    Plan    Essential Hypertension Plan: The blood pressure will be monitored closely. The patient was instructed to continue the current medications.    Iron Deficiency Anemia Plan: The current plan was continued.    Anne Marie was seen today for follow-up.    Diagnoses and all orders for this visit:    Essential hypertension  -     Comprehensive Metabolic Panel; Future    Iron deficiency anemia, unspecified iron deficiency anemia type  -     CBC & Differential; Future  -     Ferritin; Future  -     Iron Profile; Future        Return to Office    The patient was instructed to return for follow-up in 3 months.    The patient was instructed to return sooner if the condition changes, worsens, or does not resolve.

## 2020-05-26 ENCOUNTER — LAB (OUTPATIENT)
Dept: INTERNAL MEDICINE | Facility: CLINIC | Age: 40
End: 2020-05-26

## 2020-05-26 DIAGNOSIS — D50.9 IRON DEFICIENCY ANEMIA, UNSPECIFIED IRON DEFICIENCY ANEMIA TYPE: ICD-10-CM

## 2020-05-26 DIAGNOSIS — I10 ESSENTIAL HYPERTENSION: ICD-10-CM

## 2020-05-26 LAB
ALBUMIN SERPL-MCNC: 4.1 G/DL (ref 3.5–5.2)
ALBUMIN/GLOB SERPL: 1.4 G/DL
ALP SERPL-CCNC: 61 U/L (ref 39–117)
ALT SERPL W P-5'-P-CCNC: 11 U/L (ref 1–33)
ANION GAP SERPL CALCULATED.3IONS-SCNC: 13.3 MMOL/L (ref 5–15)
AST SERPL-CCNC: 13 U/L (ref 1–32)
BASOPHILS # BLD AUTO: 0.06 10*3/MM3 (ref 0–0.2)
BASOPHILS NFR BLD AUTO: 0.8 % (ref 0–1.5)
BILIRUB SERPL-MCNC: 0.3 MG/DL (ref 0.2–1.2)
BUN BLD-MCNC: 13 MG/DL (ref 6–20)
BUN/CREAT SERPL: 20.6 (ref 7–25)
CALCIUM SPEC-SCNC: 9.5 MG/DL (ref 8.6–10.5)
CHLORIDE SERPL-SCNC: 101 MMOL/L (ref 98–107)
CO2 SERPL-SCNC: 22.7 MMOL/L (ref 22–29)
CREAT BLD-MCNC: 0.63 MG/DL (ref 0.57–1)
DEPRECATED RDW RBC AUTO: 43.8 FL (ref 37–54)
EOSINOPHIL # BLD AUTO: 0.15 10*3/MM3 (ref 0–0.4)
EOSINOPHIL NFR BLD AUTO: 2 % (ref 0.3–6.2)
ERYTHROCYTE [DISTWIDTH] IN BLOOD BY AUTOMATED COUNT: 13.1 % (ref 12.3–15.4)
FERRITIN SERPL-MCNC: 27.9 NG/ML (ref 13–150)
GFR SERPL CREATININE-BSD FRML MDRD: 104 ML/MIN/1.73
GLOBULIN UR ELPH-MCNC: 3 GM/DL
GLUCOSE BLD-MCNC: 91 MG/DL (ref 65–99)
HCT VFR BLD AUTO: 35.7 % (ref 34–46.6)
HGB BLD-MCNC: 12 G/DL (ref 12–15.9)
IMM GRANULOCYTES # BLD AUTO: 0.03 10*3/MM3 (ref 0–0.05)
IMM GRANULOCYTES NFR BLD AUTO: 0.4 % (ref 0–0.5)
IRON 24H UR-MRATE: 179 MCG/DL (ref 37–145)
IRON SATN MFR SERPL: 44 % (ref 20–50)
LYMPHOCYTES # BLD AUTO: 2.22 10*3/MM3 (ref 0.7–3.1)
LYMPHOCYTES NFR BLD AUTO: 29.5 % (ref 19.6–45.3)
MCH RBC QN AUTO: 30.6 PG (ref 26.6–33)
MCHC RBC AUTO-ENTMCNC: 33.6 G/DL (ref 31.5–35.7)
MCV RBC AUTO: 91.1 FL (ref 79–97)
MONOCYTES # BLD AUTO: 0.56 10*3/MM3 (ref 0.1–0.9)
MONOCYTES NFR BLD AUTO: 7.4 % (ref 5–12)
NEUTROPHILS # BLD AUTO: 4.5 10*3/MM3 (ref 1.7–7)
NEUTROPHILS NFR BLD AUTO: 59.9 % (ref 42.7–76)
NRBC BLD AUTO-RTO: 0 /100 WBC (ref 0–0.2)
PLATELET # BLD AUTO: 302 10*3/MM3 (ref 140–450)
PMV BLD AUTO: 11.3 FL (ref 6–12)
POTASSIUM BLD-SCNC: 3.9 MMOL/L (ref 3.5–5.2)
PROT SERPL-MCNC: 7.1 G/DL (ref 6–8.5)
RBC # BLD AUTO: 3.92 10*6/MM3 (ref 3.77–5.28)
SODIUM BLD-SCNC: 137 MMOL/L (ref 136–145)
TIBC SERPL-MCNC: 404 MCG/DL (ref 298–536)
TRANSFERRIN SERPL-MCNC: 271 MG/DL (ref 200–360)
WBC NRBC COR # BLD: 7.52 10*3/MM3 (ref 3.4–10.8)

## 2020-05-26 PROCEDURE — 82728 ASSAY OF FERRITIN: CPT | Performed by: INTERNAL MEDICINE

## 2020-05-26 PROCEDURE — 80053 COMPREHEN METABOLIC PANEL: CPT | Performed by: INTERNAL MEDICINE

## 2020-05-26 PROCEDURE — 83540 ASSAY OF IRON: CPT | Performed by: INTERNAL MEDICINE

## 2020-05-26 PROCEDURE — 85025 COMPLETE CBC W/AUTO DIFF WBC: CPT | Performed by: INTERNAL MEDICINE

## 2020-05-26 PROCEDURE — 84466 ASSAY OF TRANSFERRIN: CPT | Performed by: INTERNAL MEDICINE

## 2020-05-26 PROCEDURE — 36415 COLL VENOUS BLD VENIPUNCTURE: CPT | Performed by: INTERNAL MEDICINE

## 2020-05-31 DIAGNOSIS — I10 ESSENTIAL HYPERTENSION: ICD-10-CM

## 2020-06-01 DIAGNOSIS — I10 ESSENTIAL HYPERTENSION: ICD-10-CM

## 2020-06-01 RX ORDER — TRIAMTERENE AND HYDROCHLOROTHIAZIDE 37.5; 25 MG/1; MG/1
CAPSULE ORAL
Qty: 30 CAPSULE | Refills: 1 | OUTPATIENT
Start: 2020-06-01

## 2020-06-01 RX ORDER — TRIAMTERENE AND HYDROCHLOROTHIAZIDE 37.5; 25 MG/1; MG/1
1 CAPSULE ORAL EVERY MORNING
Qty: 90 CAPSULE | Refills: 1 | Status: SHIPPED | OUTPATIENT
Start: 2020-06-01 | End: 2020-11-28

## 2020-06-30 ENCOUNTER — OFFICE VISIT (OUTPATIENT)
Dept: INTERNAL MEDICINE | Facility: CLINIC | Age: 40
End: 2020-06-30

## 2020-06-30 VITALS
SYSTOLIC BLOOD PRESSURE: 144 MMHG | DIASTOLIC BLOOD PRESSURE: 96 MMHG | RESPIRATION RATE: 18 BRPM | BODY MASS INDEX: 43.42 KG/M2 | WEIGHT: 267 LBS | TEMPERATURE: 98.2 F | HEART RATE: 88 BPM

## 2020-06-30 DIAGNOSIS — I10 ESSENTIAL HYPERTENSION: Primary | ICD-10-CM

## 2020-06-30 PROCEDURE — 99213 OFFICE O/P EST LOW 20 MIN: CPT | Performed by: INTERNAL MEDICINE

## 2020-06-30 RX ORDER — PRENATAL VIT/IRON FUM/FOLIC AC 27 MG-1 MG
1 TABLET ORAL DAILY
Qty: 30 EACH | Refills: 11 | Status: SHIPPED | OUTPATIENT
Start: 2020-06-30 | End: 2021-03-08

## 2020-06-30 RX ORDER — LOSARTAN POTASSIUM 50 MG/1
75 TABLET ORAL DAILY
Qty: 45 TABLET | Refills: 5 | Status: SHIPPED | OUTPATIENT
Start: 2020-06-30 | End: 2020-07-27

## 2020-06-30 NOTE — PROGRESS NOTES
Chief Complaint   Patient presents with   • Follow-up     3 month follow up chronic medical problems       History of Present Illness    The patient presents for a follow-up related to hypertension. The patient reports that she has had no headaches, chest pain, dyspnea, edema, syncope, blurred vision or palpitations. She states that she is taking her medication as prescribed. She is not having medication side effects. She checks her blood pressures at home. The home readings are elevated.    Review of Systems    CONSTITUTIONAL- Denies Unexplained Weight Loss, Fever, Chills, Sweats, Fatigue, Weakness or Malaise.    GASTROINTESTINAL- Denies Abdominal Pain, Nausea, Vomiting, Diarrhea, Blood per Rectum, Constipation or Heartburn.    PULMONARY- Denies Wheezing, Sputum Production, Cough, Hemoptysis or Pleuritic Chest Pain.    CARDIOVASCULAR- Denies Claudication or Irregular Heart Beat.    Medications      Current Outpatient Medications:   •  Ascorbic Acid (VITAMIN C ER) 1000 MG tablet controlled-release, Take 1 tablet by mouth Daily., Disp: , Rfl:   •  calcium carbonate-vitamin d ( Calcium 600/Vitamin D) 600-400 MG-UNIT per tablet, Take 1 tablet by mouth Daily., Disp: , Rfl:   •  docusate sodium (COLACE) 100 MG capsule, Take 100 mg by mouth 2 (Two) Times a Day As Needed., Disp: , Rfl:   •  ferrous sulfate 325 (65 FE) MG tablet, Take 325 mg by mouth Every Other Day., Disp: , Rfl:   •  losartan (COZAAR) 50 MG tablet, Take 1 tablet by mouth Daily., Disp: 90 tablet, Rfl: 1  •  triamterene-hydrochlorothiazide (Dyazide) 37.5-25 MG per capsule, Take 1 capsule by mouth Every Morning., Disp: 90 capsule, Rfl: 1     Allergies    No Known Allergies    Problem List    Patient Active Problem List   Diagnosis   • Iron deficiency anemia   • Adverse effect of iron or its compound   • Vitamin D deficiency   • Health care maintenance   • Low back pain   • Herniation of intervertebral disc of lumbar spine   • Degenerative lumbar disc   •  Essential hypertension   • Obesity       Medications, Allergies, Problems List and Past History were reviewed and updated.    Physical Examination    /96 (BP Location: Left arm, Patient Position: Sitting, Cuff Size: Adult)   Pulse 88   Temp 98.2 °F (36.8 °C) (Temporal)   Resp 18   Wt 121 kg (267 lb)   LMP 06/23/2020 (Exact Date)   Breastfeeding No   BMI 43.42 kg/m²       Neck: Thyroid- non enlarged, symmetric and has no nodules. No bruits are detected. ROM- Normal Range of Motion with no rigidity.    Lungs: Auscultation- Clear to auscultation bilaterally. There are no retractions, clubbing or cyanosis. The Expiratory to Inspiratory ratio is equal.    Cardiovascular: There are no carotid bruits. Heart- Normal Rate with Regular rhythm and no murmurs. There are no gallops. There are no rubs. In the lower extremities there is no edema. The upper extremities do not have edema.    Abdomen: Soft, benign, non-tender with no masses, hernias, organomegaly or scars.    Impression and Assessment    Essential Hypertension.    Plan    Essential Hypertension Plan: The medications were changed as noted.    Anne Marie was seen today for follow-up.    Diagnoses and all orders for this visit:    Essential hypertension  -     losartan (Cozaar) 50 MG tablet; Take 1.5 tablets by mouth Daily.    Other orders  -     Prenatal Vit-Fe Fumarate-FA (PRENATAL/FOLIC ACID) tablet; Take 1 tablet by mouth Daily.        Return to Office    The patient was instructed to return for follow-up in 1 month. Next Visit: Recheck Blood Pressure.    The patient was instructed to return sooner if the condition changes, worsens, or does not resolve.

## 2020-07-27 ENCOUNTER — OFFICE VISIT (OUTPATIENT)
Dept: INTERNAL MEDICINE | Facility: CLINIC | Age: 40
End: 2020-07-27

## 2020-07-27 VITALS
WEIGHT: 267 LBS | DIASTOLIC BLOOD PRESSURE: 90 MMHG | SYSTOLIC BLOOD PRESSURE: 132 MMHG | BODY MASS INDEX: 43.42 KG/M2 | TEMPERATURE: 97.8 F | HEART RATE: 76 BPM | RESPIRATION RATE: 20 BRPM

## 2020-07-27 DIAGNOSIS — I10 ESSENTIAL HYPERTENSION: Primary | ICD-10-CM

## 2020-07-27 PROCEDURE — 99213 OFFICE O/P EST LOW 20 MIN: CPT | Performed by: INTERNAL MEDICINE

## 2020-07-27 RX ORDER — LOSARTAN POTASSIUM 100 MG/1
100 TABLET ORAL DAILY
Qty: 30 TABLET | Refills: 5 | Status: SHIPPED | OUTPATIENT
Start: 2020-07-27 | End: 2021-01-25

## 2020-07-27 NOTE — PROGRESS NOTES
Chief Complaint   Patient presents with   • Follow-up     1 month follow up hypertension       History of Present Illness    The patient presents for a follow-up related to hypertension. The patient reports that she has had no headaches, chest pain, dyspnea, edema, syncope, blurred vision or palpitations. She is following a heart healthy diet. She gets an adequate amount of exercise. She states that she is taking her medication as prescribed. She is not having medication side effects. She checks her blood pressures at home. The home readings are elevated.    Review of Systems    PULMONARY- Denies Wheezing, Sputum Production, Cough, Hemoptysis or Pleuritic Chest Pain.    CARDIOVASCULAR- Denies Claudication or Irregular Heart Beat.    Medications      Current Outpatient Medications:   •  docusate sodium (COLACE) 100 MG capsule, Take 100 mg by mouth 2 (Two) Times a Day As Needed., Disp: , Rfl:   •  ferrous sulfate 325 (65 FE) MG tablet, Take 325 mg by mouth Every Other Day., Disp: , Rfl:   •  losartan (Cozaar) 50 MG tablet, Take 1.5 tablets by mouth Daily., Disp: 45 tablet, Rfl: 5  •  Prenatal Vit-Fe Fumarate-FA (PRENATAL/FOLIC ACID) tablet, Take 1 tablet by mouth Daily., Disp: 30 each, Rfl: 11  •  triamterene-hydrochlorothiazide (Dyazide) 37.5-25 MG per capsule, Take 1 capsule by mouth Every Morning., Disp: 90 capsule, Rfl: 1     Allergies    No Known Allergies    Problem List    Patient Active Problem List   Diagnosis   • Iron deficiency anemia   • Adverse effect of iron or its compound   • Vitamin D deficiency   • Health care maintenance   • Low back pain   • Herniation of intervertebral disc of lumbar spine   • Degenerative lumbar disc   • Essential hypertension   • Obesity       Medications, Allergies, Problems List and Past History were reviewed and updated.    Physical Examination    /90   Pulse 76   Temp 97.8 °F (36.6 °C) (Infrared)   Resp 20   Wt 121 kg (267 lb)   LMP 07/16/2020 (Exact Date)    Breastfeeding No   BMI 43.42 kg/m²     Neck: Thyroid- non enlarged, symmetric and has no nodules. No bruits are detected.    Lungs: Auscultation- Clear to auscultation bilaterally. There are no retractions, clubbing or cyanosis. The Expiratory to Inspiratory ratio is equal.    Cardiovascular: There are no carotid bruits. Heart- Normal Rate with Regular rhythm and no murmurs. There are no gallops. There are no rubs. In the lower extremities there is no edema. The upper extremities do not have edema.    Impression and Assessment    Essential Hypertension.    Plan    Essential Hypertension Plan: The medications were changed as noted.    Anne Marie was seen today for follow-up.    Diagnoses and all orders for this visit:    Essential hypertension  -     losartan (Cozaar) 100 MG tablet; Take 1 tablet by mouth Daily.        Return to Office    The patient was instructed to return for follow-up in 3 months.    The patient was instructed to return sooner if the condition changes, worsens, or does not resolve.

## 2020-09-15 DIAGNOSIS — D50.9 IRON DEFICIENCY ANEMIA, UNSPECIFIED IRON DEFICIENCY ANEMIA TYPE: Primary | ICD-10-CM

## 2020-10-20 ENCOUNTER — OFFICE VISIT (OUTPATIENT)
Dept: INTERNAL MEDICINE | Facility: CLINIC | Age: 40
End: 2020-10-20

## 2020-10-20 VITALS
BODY MASS INDEX: 42.61 KG/M2 | HEART RATE: 72 BPM | SYSTOLIC BLOOD PRESSURE: 130 MMHG | DIASTOLIC BLOOD PRESSURE: 86 MMHG | WEIGHT: 262 LBS | TEMPERATURE: 97.7 F | RESPIRATION RATE: 18 BRPM

## 2020-10-20 DIAGNOSIS — F51.01 PRIMARY INSOMNIA: ICD-10-CM

## 2020-10-20 DIAGNOSIS — I10 ESSENTIAL HYPERTENSION: Primary | ICD-10-CM

## 2020-10-20 DIAGNOSIS — D50.9 IRON DEFICIENCY ANEMIA, UNSPECIFIED IRON DEFICIENCY ANEMIA TYPE: ICD-10-CM

## 2020-10-20 DIAGNOSIS — K21.9 GASTROESOPHAGEAL REFLUX DISEASE WITHOUT ESOPHAGITIS: ICD-10-CM

## 2020-10-20 LAB
BASOPHILS # BLD AUTO: 0.05 10*3/MM3 (ref 0–0.2)
BASOPHILS NFR BLD AUTO: 0.7 % (ref 0–1.5)
DEPRECATED RDW RBC AUTO: 40.1 FL (ref 37–54)
EOSINOPHIL # BLD AUTO: 0.1 10*3/MM3 (ref 0–0.4)
EOSINOPHIL NFR BLD AUTO: 1.3 % (ref 0.3–6.2)
ERYTHROCYTE [DISTWIDTH] IN BLOOD BY AUTOMATED COUNT: 12.4 % (ref 12.3–15.4)
HCT VFR BLD AUTO: 38.1 % (ref 34–46.6)
HGB BLD-MCNC: 13.1 G/DL (ref 12–15.9)
IMM GRANULOCYTES # BLD AUTO: 0.02 10*3/MM3 (ref 0–0.05)
IMM GRANULOCYTES NFR BLD AUTO: 0.3 % (ref 0–0.5)
LYMPHOCYTES # BLD AUTO: 2.03 10*3/MM3 (ref 0.7–3.1)
LYMPHOCYTES NFR BLD AUTO: 27.1 % (ref 19.6–45.3)
MCH RBC QN AUTO: 30.8 PG (ref 26.6–33)
MCHC RBC AUTO-ENTMCNC: 34.4 G/DL (ref 31.5–35.7)
MCV RBC AUTO: 89.6 FL (ref 79–97)
MONOCYTES # BLD AUTO: 0.72 10*3/MM3 (ref 0.1–0.9)
MONOCYTES NFR BLD AUTO: 9.6 % (ref 5–12)
NEUTROPHILS NFR BLD AUTO: 4.58 10*3/MM3 (ref 1.7–7)
NEUTROPHILS NFR BLD AUTO: 61 % (ref 42.7–76)
NRBC BLD AUTO-RTO: 0 /100 WBC (ref 0–0.2)
PLATELET # BLD AUTO: 290 10*3/MM3 (ref 140–450)
PMV BLD AUTO: 11.5 FL (ref 6–12)
RBC # BLD AUTO: 4.25 10*6/MM3 (ref 3.77–5.28)
WBC # BLD AUTO: 7.5 10*3/MM3 (ref 3.4–10.8)

## 2020-10-20 PROCEDURE — 99214 OFFICE O/P EST MOD 30 MIN: CPT | Performed by: INTERNAL MEDICINE

## 2020-10-20 PROCEDURE — 36415 COLL VENOUS BLD VENIPUNCTURE: CPT | Performed by: INTERNAL MEDICINE

## 2020-10-20 PROCEDURE — 84466 ASSAY OF TRANSFERRIN: CPT | Performed by: INTERNAL MEDICINE

## 2020-10-20 PROCEDURE — 82728 ASSAY OF FERRITIN: CPT | Performed by: INTERNAL MEDICINE

## 2020-10-20 PROCEDURE — 85025 COMPLETE CBC W/AUTO DIFF WBC: CPT | Performed by: INTERNAL MEDICINE

## 2020-10-20 PROCEDURE — 83540 ASSAY OF IRON: CPT | Performed by: INTERNAL MEDICINE

## 2020-10-20 PROCEDURE — 80053 COMPREHEN METABOLIC PANEL: CPT | Performed by: INTERNAL MEDICINE

## 2020-10-20 RX ORDER — TRAZODONE HYDROCHLORIDE 50 MG/1
50-100 TABLET ORAL NIGHTLY
Qty: 60 TABLET | Refills: 5 | Status: SHIPPED | OUTPATIENT
Start: 2020-10-20 | End: 2022-02-28

## 2020-10-20 NOTE — PROGRESS NOTES
Chief Complaint   Patient presents with   • Follow-up     3 month follow up chronic medical problems        History of Present Illness      The patient presents for a follow-up related to hypertension. The patient reports that she has had no headaches, chest pain, dyspnea, edema, syncope, blurred vision or palpitations. She states that she is taking her medication as prescribed. She is not having medication side effects.    The patient presents for a follow-up related to GERD. The patient is not on medication for her gastroesophageal reflux. She reports no abdominal pain, belching, diarrhea, dysphagia, early satiety, heartburn, hoarseness, nausea, odynophagia, rectal bleeding, vomiting or weight loss. The GERD has no known aggravating factors.    The patient presents for a follow-up related to insomnia. She denies feelings of depression and anxiety. There is no history of excessive caffeine usage. There is no history of nocturia. She is not taking a medication for her insomia.    The patient has trouble falling asleep and typically awakens more than three times. The patient does not snore and denies gasping or stopping breathing at night. She feels well rested during the day and she denies falling asleep while watching television, while driving or during conversation.       The patient presents for a follow-up related to iron deficiency anemia. There are no reports of blood loss. The patient has no symptoms of a dry cough, a wet cough, fever, myalgias, sweats, decreased appetite, chills, fatigue or paresthesias. The patient's energy level is normal.    Review of Systems    NECK- Denies Decreased Range of Motion, Stiffness, Thyroid Nodules, Enlarged Lymph Nodes or Goiter.    HENT- Denies Nasal Discharge, Sore Throat, Ear Pain, Ear Drainage, Sinus Pain, Nasal Congestion, Decreased Hearing or Tinnitus.    PULMONARY- Denies Wheezing, Sputum Production, Cough, Hemoptysis or Pleuritic Chest Pain.    CARDIOVASCULAR- Denies  Claudication or Irregular Heart Beat.    Medications      Current Outpatient Medications:   •  docusate sodium (COLACE) 100 MG capsule, Take 100 mg by mouth 2 (Two) Times a Day As Needed., Disp: , Rfl:   •  ferrous sulfate 325 (65 FE) MG tablet, Take 325 mg by mouth Every Other Day., Disp: , Rfl:   •  losartan (Cozaar) 100 MG tablet, Take 1 tablet by mouth Daily., Disp: 30 tablet, Rfl: 5  •  Prenatal Vit-Fe Fumarate-FA (PRENATAL/FOLIC ACID) tablet, Take 1 tablet by mouth Daily., Disp: 30 each, Rfl: 11  •  triamterene-hydrochlorothiazide (Dyazide) 37.5-25 MG per capsule, Take 1 capsule by mouth Every Morning., Disp: 90 capsule, Rfl: 1  •  traZODone (DESYREL) 50 MG tablet, Take 1-2 tablets by mouth Every Night., Disp: 60 tablet, Rfl: 5     Allergies    No Known Allergies    Problem List    Patient Active Problem List   Diagnosis   • Iron deficiency anemia   • Adverse effect of iron or its compound   • Vitamin D deficiency   • Health care maintenance   • Low back pain   • Herniation of intervertebral disc of lumbar spine   • Degenerative lumbar disc   • Essential hypertension   • Obesity       Medications, Allergies, Problems List and Past History were reviewed and updated.    Physical Examination    /86 (BP Location: Left arm, Patient Position: Sitting, Cuff Size: Adult)   Pulse 72   Temp 97.7 °F (36.5 °C) (Infrared)   Resp 18   Wt 119 kg (262 lb)   LMP 09/23/2020 (Exact Date)   Breastfeeding No   BMI 42.61 kg/m²     HEENT: Head- Normocephalic Atraumatic. Facies- Within normal limits. Pinnas- Normal texture and shape bilaterally. Canals- Normal bilaterally. TMs- Normal bilaterally. Nares- Patent bilaterally. Nasal Septum- is normal. There is no tenderness to palpation over the frontal or maxillary sinuses. Lids- Normal bilaterally. Conjunctiva- Clear bilaterally. Sclera- Anicteric bilaterally. Oropharynx- Moist with no lesions. Tonsils- No enlargement, erythema or exudate.    Neck: Thyroid- non enlarged,  symmetric and has no nodules. No bruits are detected. ROM- Normal Range of Motion with no rigidity.    Lungs: Auscultation- Clear to auscultation bilaterally. There are no retractions, clubbing or cyanosis. The Expiratory to Inspiratory ratio is equal.    Lymph Nodes: Cervical- no enlarged lymph nodes noted.    Cardiovascular: There are no carotid bruits. Heart- Normal Rate with Regular rhythm and no murmurs. There are no gallops. There are no rubs. In the lower extremities there is no edema. The upper extremities do not have edema.    Abdomen: Soft, benign, non-tender with no masses, hernias, organomegaly or scars.    Impression and Assessment    Essential Hypertension.    Gastroesophageal Reflux Disease.    Insomnia.    Iron Deficiency Anemia.    Plan    Gastroesophageal Reflux Disease Plan: The current plan was continued.    Essential Hypertension Plan: The current plan was continued.    Insomnia Plan: Medication was added as noted below.    Iron Deficiency Anemia Plan: Further plans will be made after results of tests are available.    Diagnoses and all orders for this visit:    1. Essential hypertension (Primary)  -     Comprehensive Metabolic Panel    2. Iron deficiency anemia, unspecified iron deficiency anemia type  -     CBC & Differential  -     Ferritin  -     Iron Profile  -     CBC Auto Differential    3. Gastroesophageal reflux disease without esophagitis    4. Primary insomnia  -     traZODone (DESYREL) 50 MG tablet; Take 1-2 tablets by mouth Every Night.  Dispense: 60 tablet; Refill: 5        Return to Office    The patient was instructed to return for follow-up in 6 months. Next Visit: Physical.    The patient was instructed to return sooner if the condition changes, worsens, or does not resolve.

## 2020-10-21 LAB
ALBUMIN SERPL-MCNC: 4.2 G/DL (ref 3.5–5.2)
ALBUMIN/GLOB SERPL: 1.4 G/DL
ALP SERPL-CCNC: 65 U/L (ref 39–117)
ALT SERPL W P-5'-P-CCNC: 16 U/L (ref 1–33)
ANION GAP SERPL CALCULATED.3IONS-SCNC: 8.6 MMOL/L (ref 5–15)
AST SERPL-CCNC: 20 U/L (ref 1–32)
BILIRUB SERPL-MCNC: 0.4 MG/DL (ref 0–1.2)
BUN SERPL-MCNC: 14 MG/DL (ref 6–20)
BUN/CREAT SERPL: 21.5 (ref 7–25)
CALCIUM SPEC-SCNC: 8.9 MG/DL (ref 8.6–10.5)
CHLORIDE SERPL-SCNC: 102 MMOL/L (ref 98–107)
CO2 SERPL-SCNC: 26.4 MMOL/L (ref 22–29)
CREAT SERPL-MCNC: 0.65 MG/DL (ref 0.57–1)
FERRITIN SERPL-MCNC: 27.9 NG/ML (ref 13–150)
GFR SERPL CREATININE-BSD FRML MDRD: 100 ML/MIN/1.73
GLOBULIN UR ELPH-MCNC: 2.9 GM/DL
GLUCOSE SERPL-MCNC: 84 MG/DL (ref 65–99)
IRON 24H UR-MRATE: 89 MCG/DL (ref 37–145)
IRON SATN MFR SERPL: 21 % (ref 20–50)
POTASSIUM SERPL-SCNC: 3.9 MMOL/L (ref 3.5–5.2)
PROT SERPL-MCNC: 7.1 G/DL (ref 6–8.5)
SODIUM SERPL-SCNC: 137 MMOL/L (ref 136–145)
TIBC SERPL-MCNC: 431 MCG/DL (ref 298–536)
TRANSFERRIN SERPL-MCNC: 289 MG/DL (ref 200–360)

## 2020-11-28 DIAGNOSIS — I10 ESSENTIAL HYPERTENSION: ICD-10-CM

## 2020-11-28 RX ORDER — TRIAMTERENE AND HYDROCHLOROTHIAZIDE 37.5; 25 MG/1; MG/1
CAPSULE ORAL
Qty: 30 CAPSULE | Refills: 0 | Status: SHIPPED | OUTPATIENT
Start: 2020-11-28 | End: 2020-12-28

## 2020-12-28 DIAGNOSIS — I10 ESSENTIAL HYPERTENSION: ICD-10-CM

## 2020-12-28 RX ORDER — TRIAMTERENE AND HYDROCHLOROTHIAZIDE 37.5; 25 MG/1; MG/1
CAPSULE ORAL
Qty: 30 CAPSULE | Refills: 0 | Status: SHIPPED | OUTPATIENT
Start: 2020-12-28 | End: 2021-01-25

## 2021-01-24 DIAGNOSIS — I10 ESSENTIAL HYPERTENSION: ICD-10-CM

## 2021-01-25 RX ORDER — TRIAMTERENE AND HYDROCHLOROTHIAZIDE 37.5; 25 MG/1; MG/1
CAPSULE ORAL
Qty: 90 CAPSULE | Refills: 1 | Status: SHIPPED | OUTPATIENT
Start: 2021-01-25 | End: 2021-07-30

## 2021-01-25 RX ORDER — LOSARTAN POTASSIUM 100 MG/1
TABLET ORAL
Qty: 90 TABLET | Refills: 1 | Status: SHIPPED | OUTPATIENT
Start: 2021-01-25 | End: 2021-07-30

## 2021-03-04 ENCOUNTER — TELEPHONE (OUTPATIENT)
Dept: INTERNAL MEDICINE | Facility: CLINIC | Age: 41
End: 2021-03-04

## 2021-03-04 NOTE — TELEPHONE ENCOUNTER
PT CALLED STATED THAT RX  Prenatal Vit-Fe Fumarate-FA (PRENATAL/FOLIC ACID) tablet WILL BE IN BACK ORDER FOR 2 WEEKS AND PT WOULD LIKE TO KNOW IF THERE IS ANOTHER PRENATAL RX THAT DR CAN CALL IN.    PLEASE ADVISE.  CALL BACK:[phone]      [facility] - [place], [place] - [address]

## 2021-03-08 RX ORDER — GLYCERIN/MINERAL OIL
1 LOTION (ML) TOPICAL DAILY
Qty: 30 EACH | Refills: 7 | Status: SHIPPED | OUTPATIENT
Start: 2021-03-08 | End: 2021-11-15

## 2021-03-09 NOTE — TELEPHONE ENCOUNTER
Called and spoke with patient, informed her that new prenatal vitamin was called into pharmacy. She verbalized understanding and had no further questions.

## 2021-04-19 ENCOUNTER — OFFICE VISIT (OUTPATIENT)
Dept: INTERNAL MEDICINE | Facility: CLINIC | Age: 41
End: 2021-04-19

## 2021-04-19 VITALS
HEIGHT: 64 IN | HEART RATE: 84 BPM | WEIGHT: 268 LBS | SYSTOLIC BLOOD PRESSURE: 130 MMHG | TEMPERATURE: 98 F | RESPIRATION RATE: 18 BRPM | DIASTOLIC BLOOD PRESSURE: 76 MMHG | BODY MASS INDEX: 45.75 KG/M2

## 2021-04-19 DIAGNOSIS — F51.01 PRIMARY INSOMNIA: ICD-10-CM

## 2021-04-19 DIAGNOSIS — D50.9 IRON DEFICIENCY ANEMIA, UNSPECIFIED IRON DEFICIENCY ANEMIA TYPE: ICD-10-CM

## 2021-04-19 DIAGNOSIS — Z00.00 PHYSICAL EXAM: Primary | ICD-10-CM

## 2021-04-19 DIAGNOSIS — I10 ESSENTIAL HYPERTENSION: ICD-10-CM

## 2021-04-19 DIAGNOSIS — K21.9 GASTROESOPHAGEAL REFLUX DISEASE WITHOUT ESOPHAGITIS: ICD-10-CM

## 2021-04-19 LAB
BILIRUB BLD-MCNC: NEGATIVE MG/DL
CLARITY, POC: CLEAR
COLOR UR: YELLOW
EXPIRATION DATE: NORMAL
GLUCOSE UR STRIP-MCNC: NEGATIVE MG/DL
KETONES UR QL: NEGATIVE
LEUKOCYTE EST, POC: NEGATIVE
Lab: NORMAL
NITRITE UR-MCNC: NEGATIVE MG/ML
PH UR: 6 [PH] (ref 5–8)
PROT UR STRIP-MCNC: NEGATIVE MG/DL
RBC # UR STRIP: NEGATIVE /UL
SP GR UR: 1.02 (ref 1–1.03)
UROBILINOGEN UR QL: NORMAL

## 2021-04-19 PROCEDURE — 81003 URINALYSIS AUTO W/O SCOPE: CPT | Performed by: INTERNAL MEDICINE

## 2021-04-19 PROCEDURE — 82306 VITAMIN D 25 HYDROXY: CPT | Performed by: INTERNAL MEDICINE

## 2021-04-19 PROCEDURE — 82728 ASSAY OF FERRITIN: CPT | Performed by: INTERNAL MEDICINE

## 2021-04-19 PROCEDURE — 84443 ASSAY THYROID STIM HORMONE: CPT | Performed by: INTERNAL MEDICINE

## 2021-04-19 PROCEDURE — 86803 HEPATITIS C AB TEST: CPT | Performed by: INTERNAL MEDICINE

## 2021-04-19 PROCEDURE — 85025 COMPLETE CBC W/AUTO DIFF WBC: CPT | Performed by: INTERNAL MEDICINE

## 2021-04-19 PROCEDURE — 83540 ASSAY OF IRON: CPT | Performed by: INTERNAL MEDICINE

## 2021-04-19 PROCEDURE — 84466 ASSAY OF TRANSFERRIN: CPT | Performed by: INTERNAL MEDICINE

## 2021-04-19 PROCEDURE — 99396 PREV VISIT EST AGE 40-64: CPT | Performed by: INTERNAL MEDICINE

## 2021-04-19 PROCEDURE — 36415 COLL VENOUS BLD VENIPUNCTURE: CPT | Performed by: INTERNAL MEDICINE

## 2021-04-19 PROCEDURE — 80053 COMPREHEN METABOLIC PANEL: CPT | Performed by: INTERNAL MEDICINE

## 2021-04-19 PROCEDURE — 80061 LIPID PANEL: CPT | Performed by: INTERNAL MEDICINE

## 2021-04-20 LAB
25(OH)D3 SERPL-MCNC: 15.6 NG/ML (ref 30–100)
ALBUMIN SERPL-MCNC: 4.3 G/DL (ref 3.5–5.2)
ALBUMIN/GLOB SERPL: 1.6 G/DL
ALP SERPL-CCNC: 70 U/L (ref 39–117)
ALT SERPL W P-5'-P-CCNC: 19 U/L (ref 1–33)
ANION GAP SERPL CALCULATED.3IONS-SCNC: 12.6 MMOL/L (ref 5–15)
AST SERPL-CCNC: 19 U/L (ref 1–32)
BASOPHILS # BLD AUTO: 0.06 10*3/MM3 (ref 0–0.2)
BASOPHILS NFR BLD AUTO: 0.7 % (ref 0–1.5)
BILIRUB SERPL-MCNC: 0.2 MG/DL (ref 0–1.2)
BUN SERPL-MCNC: 13 MG/DL (ref 6–20)
BUN/CREAT SERPL: 21.3 (ref 7–25)
CALCIUM SPEC-SCNC: 9.5 MG/DL (ref 8.6–10.5)
CHLORIDE SERPL-SCNC: 97 MMOL/L (ref 98–107)
CHOLEST SERPL-MCNC: 237 MG/DL (ref 0–200)
CO2 SERPL-SCNC: 27.4 MMOL/L (ref 22–29)
CREAT SERPL-MCNC: 0.61 MG/DL (ref 0.57–1)
DEPRECATED RDW RBC AUTO: 45.5 FL (ref 37–54)
EOSINOPHIL # BLD AUTO: 0.08 10*3/MM3 (ref 0–0.4)
EOSINOPHIL NFR BLD AUTO: 0.9 % (ref 0.3–6.2)
ERYTHROCYTE [DISTWIDTH] IN BLOOD BY AUTOMATED COUNT: 13.1 % (ref 12.3–15.4)
FERRITIN SERPL-MCNC: 27.9 NG/ML (ref 13–150)
GFR SERPL CREATININE-BSD FRML MDRD: 107 ML/MIN/1.73
GLOBULIN UR ELPH-MCNC: 2.7 GM/DL
GLUCOSE SERPL-MCNC: 83 MG/DL (ref 65–99)
HCT VFR BLD AUTO: 40.4 % (ref 34–46.6)
HCV AB SER DONR QL: NORMAL
HDLC SERPL-MCNC: 49 MG/DL (ref 40–60)
HGB BLD-MCNC: 13 G/DL (ref 12–15.9)
IMM GRANULOCYTES # BLD AUTO: 0.02 10*3/MM3 (ref 0–0.05)
IMM GRANULOCYTES NFR BLD AUTO: 0.2 % (ref 0–0.5)
IRON 24H UR-MRATE: 69 MCG/DL (ref 37–145)
IRON SATN MFR SERPL: 15 % (ref 20–50)
LDLC SERPL CALC-MCNC: 149 MG/DL (ref 0–100)
LDLC/HDLC SERPL: 2.97 {RATIO}
LYMPHOCYTES # BLD AUTO: 2.25 10*3/MM3 (ref 0.7–3.1)
LYMPHOCYTES NFR BLD AUTO: 25.2 % (ref 19.6–45.3)
MCH RBC QN AUTO: 30.7 PG (ref 26.6–33)
MCHC RBC AUTO-ENTMCNC: 32.2 G/DL (ref 31.5–35.7)
MCV RBC AUTO: 95.3 FL (ref 79–97)
MONOCYTES # BLD AUTO: 0.54 10*3/MM3 (ref 0.1–0.9)
MONOCYTES NFR BLD AUTO: 6 % (ref 5–12)
NEUTROPHILS NFR BLD AUTO: 5.98 10*3/MM3 (ref 1.7–7)
NEUTROPHILS NFR BLD AUTO: 67 % (ref 42.7–76)
NRBC BLD AUTO-RTO: 0 /100 WBC (ref 0–0.2)
PLATELET # BLD AUTO: 330 10*3/MM3 (ref 140–450)
PMV BLD AUTO: 11.2 FL (ref 6–12)
POTASSIUM SERPL-SCNC: 3.6 MMOL/L (ref 3.5–5.2)
PROT SERPL-MCNC: 7 G/DL (ref 6–8.5)
RBC # BLD AUTO: 4.24 10*6/MM3 (ref 3.77–5.28)
SODIUM SERPL-SCNC: 137 MMOL/L (ref 136–145)
TIBC SERPL-MCNC: 456 MCG/DL (ref 298–536)
TRANSFERRIN SERPL-MCNC: 306 MG/DL (ref 200–360)
TRIGL SERPL-MCNC: 212 MG/DL (ref 0–150)
TSH SERPL DL<=0.05 MIU/L-ACNC: 2.1 UIU/ML (ref 0.27–4.2)
VLDLC SERPL-MCNC: 39 MG/DL (ref 5–40)
WBC # BLD AUTO: 8.93 10*3/MM3 (ref 3.4–10.8)

## 2021-04-20 NOTE — PROGRESS NOTES
Chief Complaint   Patient presents with   • Annual Exam       History of Present Illness    The patient presents for an established patient physical examination and health maintenance visit.    The patient presents for a follow-up related to GERD. The patient is not on medication for her gastroesophageal reflux. She reports no abdominal pain, belching, chest pain, diarrhea, dysphagia, early satiety, heartburn, hoarseness, nausea, odynophagia, rectal bleeding, vomiting or weight loss. The GERD has no known aggravating factors.    The patient presents for a follow-up related to hypertension. The patient reports that she has had no headaches, dyspnea, edema, syncope, blurred vision or palpitations. She states that she is taking her medication as prescribed. She is not having medication side effects.    The patient presents for a follow-up related to iron deficiency anemia. There are no reports of blood loss. The patient has no symptoms of a dry cough, a wet cough, wheezing, fever, myalgias, sweats, decreased appetite, chills, fatigue or paresthesias. The patient's energy level is normal.    The patient presents for a follow-up related to insomnia. She denies feelings of depression and anxiety. There is no history of excessive caffeine usage. There is no history of nocturia. She is not taking a medication for her insomia.    The patient falls asleep easily and typically does not awaken at night. The patient does not snore and denies gasping or stopping breathing at night. She feels well rested during the day and she denies falling asleep while watching television, while driving or during conversation.       Review of Systems    NECK- Denies Decreased Range of Motion, Stiffness, Thyroid Nodules, Enlarged Lymph Nodes or Goiter.    HENT- Denies Nasal Discharge, Sore Throat, Ear Pain, Ear Drainage, Sinus Pain, Nasal Congestion, Decreased Hearing or Tinnitus.    PULMONARY- Denies Sputum Production, Cough, Hemoptysis or  "Pleuritic Chest Pain.    CARDIOVASCULAR- Denies Claudication or Irregular Heart Beat.    Medications      Current Outpatient Medications:   •  docusate sodium (COLACE) 100 MG capsule, Take 100 mg by mouth 2 (Two) Times a Day As Needed., Disp: , Rfl:   •  ferrous sulfate 325 (65 FE) MG tablet, Take 325 mg by mouth Every Other Day., Disp: , Rfl:   •  losartan (COZAAR) 100 MG tablet, TAKE ONE TABLET BY MOUTH DAILY, Disp: 90 tablet, Rfl: 1  •  Prenatal Vit-Fe Fumarate-FA ( Prenatal Vitamins) 28-0.8 MG tablet, Take 1 tablet by mouth Daily., Disp: 30 each, Rfl: 7  •  traZODone (DESYREL) 50 MG tablet, Take 1-2 tablets by mouth Every Night., Disp: 60 tablet, Rfl: 5  •  triamterene-hydrochlorothiazide (DYAZIDE) 37.5-25 MG per capsule, TAKE ONE CAPSULE BY MOUTH EVERY MORNING, Disp: 90 capsule, Rfl: 1     Allergies    No Known Allergies    Problem List    Patient Active Problem List   Diagnosis   • Iron deficiency anemia   • Adverse effect of iron or its compound   • Vitamin D deficiency   • Health care maintenance   • Low back pain   • Herniation of intervertebral disc of lumbar spine   • Degenerative lumbar disc   • Essential hypertension   • Obesity       Medications, Allergies, Problems List and Past History were reviewed and updated.    Physical Examination    /76   Pulse 84   Temp 98 °F (36.7 °C) (Infrared)   Resp 18   Ht 163.2 cm (64.25\")   Wt 122 kg (268 lb)   LMP 04/02/2021   Breastfeeding No   BMI 45.64 kg/m²     HEENT: Head- Normocephalic Atraumatic. Facies- Within normal limits. Pinnas- Normal texture and shape bilaterally. Canals- Normal bilaterally. TMs- Normal bilaterally. Nares- Patent bilaterally. Nasal Septum- is normal. There is no tenderness to palpation over the frontal or maxillary sinuses. Lids- Normal bilaterally. Conjunctiva- Clear bilaterally. Sclera- Anicteric bilaterally. Oropharynx- Moist with no lesions. Tonsils- No enlargement, erythema or exudate.    Neck: Thyroid- non enlarged, " symmetric and has no nodules. No bruits are detected. ROM- Normal Range of Motion with no rigidity.    Lungs: Auscultation- Clear to auscultation bilaterally. There are no retractions, clubbing or cyanosis. The Expiratory to Inspiratory ratio is equal.    Lymph Nodes: Cervical- no enlarged lymph nodes noted. Clavicular- no enlarged supraclavicular lymph nodes noted. Axillary- no enlarged axillary lymph nodes noted. Inguinal- no enlarged inguinal lymph nodes noted.    Cardiovascular: There are no carotid bruits. Heart- Normal Rate with Regular rhythm and no murmurs. There are no gallops. There are no rubs. In the lower extremities there is no edema. The upper extremities do not have edema.    Abdomen: Soft, benign, non-tender with no masses, hernias, organomegaly or scars.    Breast: Normal contours bilaterally with no masses, discharge, skin changes or lumps. There are no scars noted.    Dermatologic: The patient has no worrisome or suspicious skin lesions noted.    Impression and Assessment    Normal Physical Examination.    Gastroesophageal Reflux Disease.    Essential Hypertension.    Iron Deficiency Anemia.    Insomnia.    Plan    Gastroesophageal Reflux Disease Plan: The condition is stable. No change is needed in the current plan.    Essential Hypertension Plan: The patient was instructed to continue the current medications.    Insomnia Plan: The condition is stable. No change is needed in the current plan.    Iron Deficiency Anemia Plan: The patient was instructed to continue the current medications.    Counseling was provided regarding: Adequate Aerobic Exercise, Dental Visits, Flossing Teeth and Heart Healthy Diet.    The following was ordered for screening and health maintenance: CBC w Automated Diff, CMP, Hepatitis C Antibody, Lipid Profile, TSH and U/A.       Immunizations Ordered and Administered: None.    Diagnoses and all orders for this visit:    1. Physical exam (Primary)  -     Lipid Panel  -      TSH  -     Vitamin D 25 Hydroxy  -     POC Urinalysis Dipstick, Automated  -     Hepatitis C Antibody    2. Essential hypertension  -     Comprehensive Metabolic Panel    3. Iron deficiency anemia, unspecified iron deficiency anemia type  -     CBC & Differential  -     Ferritin  -     Iron Profile    4. Gastroesophageal reflux disease without esophagitis    5. Primary insomnia        Return to Office    The patient was instructed to return for follow-up in 6 months. The patient was instructed to return sooner if the condition changes, worsens, or does not resolve.

## 2021-07-29 DIAGNOSIS — I10 ESSENTIAL HYPERTENSION: ICD-10-CM

## 2021-07-30 RX ORDER — LOSARTAN POTASSIUM 100 MG/1
TABLET ORAL
Qty: 30 TABLET | Refills: 5 | Status: SHIPPED | OUTPATIENT
Start: 2021-07-30 | End: 2022-01-31

## 2021-07-30 RX ORDER — TRIAMTERENE AND HYDROCHLOROTHIAZIDE 37.5; 25 MG/1; MG/1
CAPSULE ORAL
Qty: 30 CAPSULE | Refills: 5 | Status: SHIPPED | OUTPATIENT
Start: 2021-07-30 | End: 2022-01-31

## 2021-10-19 ENCOUNTER — OFFICE VISIT (OUTPATIENT)
Dept: INTERNAL MEDICINE | Facility: CLINIC | Age: 41
End: 2021-10-19

## 2021-10-19 VITALS
RESPIRATION RATE: 20 BRPM | DIASTOLIC BLOOD PRESSURE: 88 MMHG | WEIGHT: 268 LBS | BODY MASS INDEX: 45.64 KG/M2 | SYSTOLIC BLOOD PRESSURE: 140 MMHG | TEMPERATURE: 97.6 F | HEART RATE: 86 BPM

## 2021-10-19 DIAGNOSIS — K21.9 GASTROESOPHAGEAL REFLUX DISEASE WITHOUT ESOPHAGITIS: ICD-10-CM

## 2021-10-19 DIAGNOSIS — I10 ESSENTIAL HYPERTENSION: Primary | ICD-10-CM

## 2021-10-19 DIAGNOSIS — F51.01 PRIMARY INSOMNIA: ICD-10-CM

## 2021-10-19 DIAGNOSIS — D50.9 IRON DEFICIENCY ANEMIA, UNSPECIFIED IRON DEFICIENCY ANEMIA TYPE: ICD-10-CM

## 2021-10-19 DIAGNOSIS — Z23 NEED FOR INFLUENZA VACCINATION: ICD-10-CM

## 2021-10-19 LAB
ALBUMIN SERPL-MCNC: 4.6 G/DL (ref 3.5–5.2)
ALBUMIN/GLOB SERPL: 1.6 G/DL
ALP SERPL-CCNC: 67 U/L (ref 39–117)
ALT SERPL W P-5'-P-CCNC: 16 U/L (ref 1–33)
ANION GAP SERPL CALCULATED.3IONS-SCNC: 13.8 MMOL/L (ref 5–15)
AST SERPL-CCNC: 17 U/L (ref 1–32)
BASOPHILS # BLD AUTO: 0.05 10*3/MM3 (ref 0–0.2)
BASOPHILS NFR BLD AUTO: 0.6 % (ref 0–1.5)
BILIRUB BLD-MCNC: NEGATIVE MG/DL
BILIRUB SERPL-MCNC: 0.2 MG/DL (ref 0–1.2)
BUN SERPL-MCNC: 16 MG/DL (ref 6–20)
BUN/CREAT SERPL: 25.4 (ref 7–25)
CALCIUM SPEC-SCNC: 9.9 MG/DL (ref 8.6–10.5)
CHLORIDE SERPL-SCNC: 100 MMOL/L (ref 98–107)
CHOLEST SERPL-MCNC: 229 MG/DL (ref 0–200)
CLARITY, POC: CLEAR
CO2 SERPL-SCNC: 25.2 MMOL/L (ref 22–29)
COLOR UR: YELLOW
CREAT SERPL-MCNC: 0.63 MG/DL (ref 0.57–1)
DEPRECATED RDW RBC AUTO: 40.5 FL (ref 37–54)
EOSINOPHIL # BLD AUTO: 0.07 10*3/MM3 (ref 0–0.4)
EOSINOPHIL NFR BLD AUTO: 0.9 % (ref 0.3–6.2)
ERYTHROCYTE [DISTWIDTH] IN BLOOD BY AUTOMATED COUNT: 12.5 % (ref 12.3–15.4)
EXPIRATION DATE: ABNORMAL
GFR SERPL CREATININE-BSD FRML MDRD: 103 ML/MIN/1.73
GLOBULIN UR ELPH-MCNC: 2.8 GM/DL
GLUCOSE SERPL-MCNC: 88 MG/DL (ref 65–99)
GLUCOSE UR STRIP-MCNC: NEGATIVE MG/DL
HCT VFR BLD AUTO: 37.8 % (ref 34–46.6)
HDLC SERPL-MCNC: 47 MG/DL (ref 40–60)
HGB BLD-MCNC: 12.8 G/DL (ref 12–15.9)
IMM GRANULOCYTES # BLD AUTO: 0.02 10*3/MM3 (ref 0–0.05)
IMM GRANULOCYTES NFR BLD AUTO: 0.3 % (ref 0–0.5)
IRON 24H UR-MRATE: 243 MCG/DL (ref 37–145)
IRON SATN MFR SERPL: 51 % (ref 20–50)
KETONES UR QL: NEGATIVE
LDLC SERPL CALC-MCNC: 128 MG/DL (ref 0–100)
LDLC/HDLC SERPL: 2.58 {RATIO}
LEUKOCYTE EST, POC: NEGATIVE
LYMPHOCYTES # BLD AUTO: 1.78 10*3/MM3 (ref 0.7–3.1)
LYMPHOCYTES NFR BLD AUTO: 22.4 % (ref 19.6–45.3)
Lab: ABNORMAL
MCH RBC QN AUTO: 30.6 PG (ref 26.6–33)
MCHC RBC AUTO-ENTMCNC: 33.9 G/DL (ref 31.5–35.7)
MCV RBC AUTO: 90.4 FL (ref 79–97)
MONOCYTES # BLD AUTO: 0.6 10*3/MM3 (ref 0.1–0.9)
MONOCYTES NFR BLD AUTO: 7.6 % (ref 5–12)
NEUTROPHILS NFR BLD AUTO: 5.42 10*3/MM3 (ref 1.7–7)
NEUTROPHILS NFR BLD AUTO: 68.2 % (ref 42.7–76)
NITRITE UR-MCNC: NEGATIVE MG/ML
NRBC BLD AUTO-RTO: 0 /100 WBC (ref 0–0.2)
PH UR: 7 [PH] (ref 5–8)
PLATELET # BLD AUTO: 292 10*3/MM3 (ref 140–450)
PMV BLD AUTO: 10.8 FL (ref 6–12)
POTASSIUM SERPL-SCNC: 3.7 MMOL/L (ref 3.5–5.2)
PROT SERPL-MCNC: 7.4 G/DL (ref 6–8.5)
PROT UR STRIP-MCNC: NEGATIVE MG/DL
RBC # BLD AUTO: 4.18 10*6/MM3 (ref 3.77–5.28)
RBC # UR STRIP: NEGATIVE /UL
SODIUM SERPL-SCNC: 139 MMOL/L (ref 136–145)
SP GR UR: 1 (ref 1–1.03)
TIBC SERPL-MCNC: 477 MCG/DL (ref 298–536)
TRANSFERRIN SERPL-MCNC: 320 MG/DL (ref 200–360)
TRIGL SERPL-MCNC: 303 MG/DL (ref 0–150)
UROBILINOGEN UR QL: NORMAL
VLDLC SERPL-MCNC: 54 MG/DL (ref 5–40)
WBC # BLD AUTO: 7.94 10*3/MM3 (ref 3.4–10.8)

## 2021-10-19 PROCEDURE — 80061 LIPID PANEL: CPT | Performed by: INTERNAL MEDICINE

## 2021-10-19 PROCEDURE — 90686 IIV4 VACC NO PRSV 0.5 ML IM: CPT | Performed by: INTERNAL MEDICINE

## 2021-10-19 PROCEDURE — 85025 COMPLETE CBC W/AUTO DIFF WBC: CPT | Performed by: INTERNAL MEDICINE

## 2021-10-19 PROCEDURE — 83540 ASSAY OF IRON: CPT | Performed by: INTERNAL MEDICINE

## 2021-10-19 PROCEDURE — 84466 ASSAY OF TRANSFERRIN: CPT | Performed by: INTERNAL MEDICINE

## 2021-10-19 PROCEDURE — 99214 OFFICE O/P EST MOD 30 MIN: CPT | Performed by: INTERNAL MEDICINE

## 2021-10-19 PROCEDURE — 80053 COMPREHEN METABOLIC PANEL: CPT | Performed by: INTERNAL MEDICINE

## 2021-10-19 PROCEDURE — 81003 URINALYSIS AUTO W/O SCOPE: CPT | Performed by: INTERNAL MEDICINE

## 2021-10-19 PROCEDURE — 82728 ASSAY OF FERRITIN: CPT | Performed by: INTERNAL MEDICINE

## 2021-10-19 PROCEDURE — 90471 IMMUNIZATION ADMIN: CPT | Performed by: INTERNAL MEDICINE

## 2021-10-19 RX ORDER — MELATONIN
1000 DAILY
COMMUNITY
End: 2022-09-28

## 2021-10-20 LAB — FERRITIN SERPL-MCNC: 22.9 NG/ML (ref 13–150)

## 2021-10-20 NOTE — PROGRESS NOTES
Chief Complaint   Patient presents with   • Essential hypertension       History of Present Illness      The patient presents for a follow-up related to hypertension. The patient reports that she has had no headaches, chest pain, dyspnea, edema, syncope, blurred vision or palpitations. She states that she is taking her medication as prescribed. She is not having medication side effects.    The patient presents for a follow-up related to iron deficiency anemia. There are no reports of blood loss. The patient has no symptoms of a dry cough, a wet cough, wheezing, fever, nausea, vomiting, diarrhea, myalgias, abdominal pain, sweats, weight loss, decreased appetite, chills, fatigue or paresthesias. The patient's energy level is normal.    The patient presents for a follow-up related to GERD. The patient is not on medication for her gastroesophageal reflux. She reports no belching, dysphagia, early satiety, heartburn, hoarseness, odynophagia or rectal bleeding. The GERD has no known aggravating factors.    The patient presents for a follow-up related to insomnia. She denies feelings of depression and anxiety. There is no history of excessive caffeine usage. There is no history of nocturia. She is taking trazodone. This has helped the patient sleep better.    The patient falls asleep easily and typically does not awaken at night. The patient does not snore and denies gasping or stopping breathing at night. She feels well rested during the day and she denies falling asleep while watching television, while driving or during conversation.       Review of Systems    NECK- Denies Decreased Range of Motion, Stiffness, Thyroid Nodules, Enlarged Lymph Nodes or Goiter.    HENT- Denies Nasal Discharge, Sore Throat, Ear Pain, Ear Drainage, Sinus Pain, Nasal Congestion, Decreased Hearing or Tinnitus.    PULMONARY- Denies Sputum Production, Cough, Hemoptysis or Pleuritic Chest Pain.    CARDIOVASCULAR- Denies Claudication or Irregular  Heart Beat.    Medications      Current Outpatient Medications:   •  cholecalciferol (VITAMIN D3) 25 MCG (1000 UT) tablet, Take 1,000 Units by mouth Daily., Disp: , Rfl:   •  docusate sodium (COLACE) 100 MG capsule, Take 100 mg by mouth 2 (Two) Times a Day As Needed., Disp: , Rfl:   •  ferrous sulfate 325 (65 FE) MG tablet, Take 325 mg by mouth Every Other Day., Disp: , Rfl:   •  losartan (COZAAR) 100 MG tablet, TAKE ONE TABLET BY MOUTH DAILY, Disp: 30 tablet, Rfl: 5  •  Prenatal Vit-Fe Fumarate-FA ( Prenatal Vitamins) 28-0.8 MG tablet, Take 1 tablet by mouth Daily., Disp: 30 each, Rfl: 7  •  traZODone (DESYREL) 50 MG tablet, Take 1-2 tablets by mouth Every Night., Disp: 60 tablet, Rfl: 5  •  triamterene-hydrochlorothiazide (DYAZIDE) 37.5-25 MG per capsule, TAKE ONE CAPSULE BY MOUTH EVERY MORNING, Disp: 30 capsule, Rfl: 5     Allergies    No Known Allergies    Problem List    Patient Active Problem List   Diagnosis   • Iron deficiency anemia   • Adverse effect of iron or its compound   • Vitamin D deficiency   • Health care maintenance   • Low back pain   • Herniation of intervertebral disc of lumbar spine   • Degenerative lumbar disc   • Essential hypertension   • Obesity       Medications, Allergies, Problems List and Past History were reviewed and updated.    Physical Examination    /88   Pulse 86   Temp 97.6 °F (36.4 °C)   Resp 20   Wt 122 kg (268 lb)   BMI 45.64 kg/m²     HEENT: Facies- Within normal limits. Lids- Normal bilaterally. Conjunctiva- Clear bilaterally. Sclera- Anicteric bilaterally.    Neck: Thyroid- non enlarged, symmetric and has no nodules. No bruits are detected. ROM- Normal Range of Motion with no rigidity.    Lungs: Auscultation- Clear to auscultation bilaterally. There are no retractions, clubbing or cyanosis. The Expiratory to Inspiratory ratio is equal.    Lymph Nodes: Cervical- no enlarged lymph nodes noted. Clavicular- Deferred. Axillary- Deferred. Inguinal-  Deferred.    Cardiovascular: There are no carotid bruits. Heart- Normal Rate with Regular rhythm and no murmurs. There are no gallops. There are no rubs. In the lower extremities there is no edema. The upper extremities do not have edema.    Abdomen: Soft, benign, non-tender with no masses, hernias, organomegaly or scars.    Impression and Assessment    Essential Hypertension.    Iron Deficiency Anemia.    Gastroesophageal Reflux Disease.    Insomnia.    Plan    Gastroesophageal Reflux Disease Plan: The condition is stable. No change is needed in the current plan.    Essential Hypertension Plan: The patient was instructed to continue the current medications.    Insomnia Plan: The patient was instructed to continue the current medications.    Iron Deficiency Anemia Plan: Further plans will be made after results of tests are available.    Diagnoses and all orders for this visit:    1. Essential hypertension (Primary)  -     CBC & Differential; Future  -     Comprehensive Metabolic Panel; Future  -     Lipid Panel; Future  -     POC Urinalysis Dipstick, Automated  -     CBC & Differential  -     Comprehensive Metabolic Panel  -     Lipid Panel    2. Iron deficiency anemia, unspecified iron deficiency anemia type  -     CBC & Differential; Future  -     Ferritin; Future  -     Iron Profile; Future  -     CBC & Differential  -     Ferritin  -     Iron Profile    3. Gastroesophageal reflux disease without esophagitis    4. Primary insomnia    5. Need for influenza vaccination  -     FluLaval/Fluarix/Fluzone >6 Months        Return to Office    The patient was instructed to return for follow-up in 6 months. The patient was instructed to return sooner if the condition changes, worsens, or does not resolve.

## 2021-11-08 DIAGNOSIS — Z12.31 ENCOUNTER FOR SCREENING MAMMOGRAM FOR BREAST CANCER: Primary | ICD-10-CM

## 2021-11-15 RX ORDER — PRENATAL VIT/IRON FUM/FOLIC AC 27MG-0.8MG
TABLET ORAL
Qty: 30 TABLET | Refills: 11 | Status: SHIPPED | OUTPATIENT
Start: 2021-11-15 | End: 2022-12-13

## 2021-12-18 ENCOUNTER — APPOINTMENT (OUTPATIENT)
Dept: OTHER | Facility: HOSPITAL | Age: 41
End: 2021-12-18

## 2021-12-18 ENCOUNTER — HOSPITAL ENCOUNTER (OUTPATIENT)
Dept: MAMMOGRAPHY | Facility: HOSPITAL | Age: 41
Discharge: HOME | End: 2021-12-18
Admitting: INTERNAL MEDICINE

## 2021-12-18 DIAGNOSIS — Z12.31 ENCOUNTER FOR SCREENING MAMMOGRAM FOR BREAST CANCER: ICD-10-CM

## 2021-12-18 DIAGNOSIS — Z92.89 HX OF MAMMOGRAM: ICD-10-CM

## 2021-12-18 PROCEDURE — 77067 SCR MAMMO BI INCL CAD: CPT | Performed by: RADIOLOGY

## 2021-12-18 PROCEDURE — 77063 BREAST TOMOSYNTHESIS BI: CPT

## 2021-12-18 PROCEDURE — 77063 BREAST TOMOSYNTHESIS BI: CPT | Performed by: RADIOLOGY

## 2021-12-18 PROCEDURE — 77067 SCR MAMMO BI INCL CAD: CPT

## 2022-01-29 DIAGNOSIS — I10 ESSENTIAL HYPERTENSION: ICD-10-CM

## 2022-01-31 RX ORDER — TRIAMTERENE AND HYDROCHLOROTHIAZIDE 37.5; 25 MG/1; MG/1
CAPSULE ORAL
Qty: 30 CAPSULE | Refills: 5 | Status: SHIPPED | OUTPATIENT
Start: 2022-01-31 | End: 2022-08-01

## 2022-01-31 RX ORDER — LOSARTAN POTASSIUM 100 MG/1
TABLET ORAL
Qty: 30 TABLET | Refills: 5 | Status: SHIPPED | OUTPATIENT
Start: 2022-01-31 | End: 2022-08-01

## 2022-02-27 DIAGNOSIS — F51.01 PRIMARY INSOMNIA: ICD-10-CM

## 2022-02-28 RX ORDER — TRAZODONE HYDROCHLORIDE 50 MG/1
TABLET ORAL
Qty: 60 TABLET | Refills: 5 | Status: SHIPPED | OUTPATIENT
Start: 2022-02-28 | End: 2022-11-16

## 2022-07-30 DIAGNOSIS — I10 ESSENTIAL HYPERTENSION: ICD-10-CM

## 2022-08-01 RX ORDER — TRIAMTERENE AND HYDROCHLOROTHIAZIDE 37.5; 25 MG/1; MG/1
CAPSULE ORAL
Qty: 30 CAPSULE | Refills: 5 | Status: SHIPPED | OUTPATIENT
Start: 2022-08-01 | End: 2022-11-16

## 2022-08-01 RX ORDER — LOSARTAN POTASSIUM 100 MG/1
TABLET ORAL
Qty: 30 TABLET | Refills: 5 | Status: SHIPPED | OUTPATIENT
Start: 2022-08-01 | End: 2022-11-16

## 2022-09-28 ENCOUNTER — OFFICE VISIT (OUTPATIENT)
Dept: INTERNAL MEDICINE | Facility: CLINIC | Age: 42
End: 2022-09-28

## 2022-09-28 VITALS
WEIGHT: 272 LBS | TEMPERATURE: 98.2 F | OXYGEN SATURATION: 99 % | RESPIRATION RATE: 20 BRPM | SYSTOLIC BLOOD PRESSURE: 150 MMHG | HEART RATE: 86 BPM | DIASTOLIC BLOOD PRESSURE: 103 MMHG | BODY MASS INDEX: 43.71 KG/M2 | HEIGHT: 66 IN

## 2022-09-28 DIAGNOSIS — I10 ESSENTIAL HYPERTENSION: Primary | ICD-10-CM

## 2022-09-28 DIAGNOSIS — Z12.4 CERVICAL CANCER SCREENING: ICD-10-CM

## 2022-09-28 DIAGNOSIS — Z13.220 LIPID SCREENING: ICD-10-CM

## 2022-09-28 DIAGNOSIS — D50.9 IRON DEFICIENCY ANEMIA, UNSPECIFIED IRON DEFICIENCY ANEMIA TYPE: ICD-10-CM

## 2022-09-28 DIAGNOSIS — Z00.00 PHYSICAL EXAM: ICD-10-CM

## 2022-09-28 LAB
ALBUMIN SERPL-MCNC: 4.3 G/DL (ref 3.5–5.2)
ALBUMIN/GLOB SERPL: 1.7 G/DL
ALP SERPL-CCNC: 78 U/L (ref 39–117)
ALT SERPL W P-5'-P-CCNC: 22 U/L (ref 1–33)
ANION GAP SERPL CALCULATED.3IONS-SCNC: 11.3 MMOL/L (ref 5–15)
AST SERPL-CCNC: 20 U/L (ref 1–32)
BILIRUB SERPL-MCNC: 0.4 MG/DL (ref 0–1.2)
BUN SERPL-MCNC: 12 MG/DL (ref 6–20)
BUN/CREAT SERPL: 17.4 (ref 7–25)
CALCIUM SPEC-SCNC: 9.7 MG/DL (ref 8.6–10.5)
CHLORIDE SERPL-SCNC: 102 MMOL/L (ref 98–107)
CHOLEST SERPL-MCNC: 220 MG/DL (ref 0–200)
CO2 SERPL-SCNC: 26.7 MMOL/L (ref 22–29)
CREAT SERPL-MCNC: 0.69 MG/DL (ref 0.57–1)
DEPRECATED RDW RBC AUTO: 44.2 FL (ref 37–54)
EGFRCR SERPLBLD CKD-EPI 2021: 109.9 ML/MIN/1.73
ERYTHROCYTE [DISTWIDTH] IN BLOOD BY AUTOMATED COUNT: 12.8 % (ref 12.3–15.4)
FERRITIN SERPL-MCNC: 34.5 NG/ML (ref 13–150)
GLOBULIN UR ELPH-MCNC: 2.5 GM/DL
GLUCOSE SERPL-MCNC: 89 MG/DL (ref 65–99)
HCT VFR BLD AUTO: 40.8 % (ref 34–46.6)
HDLC SERPL-MCNC: 44 MG/DL (ref 40–60)
HGB BLD-MCNC: 13.3 G/DL (ref 12–15.9)
IRON 24H UR-MRATE: 96 MCG/DL (ref 37–145)
IRON SATN MFR SERPL: 22 % (ref 20–50)
LDLC SERPL CALC-MCNC: 144 MG/DL (ref 0–100)
LDLC/HDLC SERPL: 3.2 {RATIO}
MCH RBC QN AUTO: 30.9 PG (ref 26.6–33)
MCHC RBC AUTO-ENTMCNC: 32.6 G/DL (ref 31.5–35.7)
MCV RBC AUTO: 94.9 FL (ref 79–97)
PLATELET # BLD AUTO: 287 10*3/MM3 (ref 140–450)
PMV BLD AUTO: 11 FL (ref 6–12)
POTASSIUM SERPL-SCNC: 4 MMOL/L (ref 3.5–5.2)
PROT SERPL-MCNC: 6.8 G/DL (ref 6–8.5)
RBC # BLD AUTO: 4.3 10*6/MM3 (ref 3.77–5.28)
SODIUM SERPL-SCNC: 140 MMOL/L (ref 136–145)
T4 FREE SERPL-MCNC: 1.03 NG/DL (ref 0.93–1.7)
TIBC SERPL-MCNC: 429 MCG/DL (ref 298–536)
TRANSFERRIN SERPL-MCNC: 288 MG/DL (ref 200–360)
TRIGL SERPL-MCNC: 175 MG/DL (ref 0–150)
TSH SERPL DL<=0.05 MIU/L-ACNC: 1.45 UIU/ML (ref 0.27–4.2)
VLDLC SERPL-MCNC: 32 MG/DL (ref 5–40)
WBC NRBC COR # BLD: 6.8 10*3/MM3 (ref 3.4–10.8)

## 2022-09-28 PROCEDURE — 84466 ASSAY OF TRANSFERRIN: CPT | Performed by: INTERNAL MEDICINE

## 2022-09-28 PROCEDURE — 80061 LIPID PANEL: CPT | Performed by: INTERNAL MEDICINE

## 2022-09-28 PROCEDURE — 84439 ASSAY OF FREE THYROXINE: CPT | Performed by: INTERNAL MEDICINE

## 2022-09-28 PROCEDURE — 80050 GENERAL HEALTH PANEL: CPT | Performed by: INTERNAL MEDICINE

## 2022-09-28 PROCEDURE — 83540 ASSAY OF IRON: CPT | Performed by: INTERNAL MEDICINE

## 2022-09-28 PROCEDURE — 99396 PREV VISIT EST AGE 40-64: CPT | Performed by: INTERNAL MEDICINE

## 2022-09-28 PROCEDURE — 36415 COLL VENOUS BLD VENIPUNCTURE: CPT | Performed by: INTERNAL MEDICINE

## 2022-09-28 PROCEDURE — 82728 ASSAY OF FERRITIN: CPT | Performed by: INTERNAL MEDICINE

## 2022-09-28 RX ORDER — LABETALOL 100 MG/1
100 TABLET, FILM COATED ORAL 2 TIMES DAILY
Qty: 60 TABLET | Refills: 3 | Status: SHIPPED | OUTPATIENT
Start: 2022-09-28 | End: 2023-02-09

## 2022-10-04 LAB — REF LAB TEST METHOD: NORMAL

## 2022-11-16 ENCOUNTER — OFFICE VISIT (OUTPATIENT)
Dept: INTERNAL MEDICINE | Facility: CLINIC | Age: 42
End: 2022-11-16

## 2022-11-16 VITALS
RESPIRATION RATE: 20 BRPM | HEART RATE: 77 BPM | DIASTOLIC BLOOD PRESSURE: 90 MMHG | BODY MASS INDEX: 44.71 KG/M2 | OXYGEN SATURATION: 99 % | WEIGHT: 277 LBS | SYSTOLIC BLOOD PRESSURE: 136 MMHG | TEMPERATURE: 98.2 F

## 2022-11-16 DIAGNOSIS — E78.5 HYPERLIPIDEMIA, UNSPECIFIED HYPERLIPIDEMIA TYPE: ICD-10-CM

## 2022-11-16 DIAGNOSIS — I10 ESSENTIAL HYPERTENSION: Primary | ICD-10-CM

## 2022-11-16 PROCEDURE — 99213 OFFICE O/P EST LOW 20 MIN: CPT | Performed by: INTERNAL MEDICINE

## 2022-11-16 NOTE — PROGRESS NOTES
Chief Complaint  Hypertension    Subjective    Anne Marie May is a 42 y.o. female.     Anne Marie May presents to [facility] for hypertension follow-up and any other medical issues.      History of Present Illness    The following portions of the patient's history were reviewed and updated as appropriate: allergies, current medications, past family history, past medical history, past social history, past surgical history and problem list.     Tinnitus  The patient reports that she has been experiencing a ringing in her ears. She states that it is not bad, but it lasts for a half a second. She is unsure if it is a side effect of her new blood pressure medication. She notes that this is the only thing that has changed for her. She reports that her blood pressure has been the same at home. She states that she thinks she noticed the ringing shortly after starting the beta blocker. She notes that it may be when she is at work and stressed and her blood pressure is elevated. She reports that she works at [employer]. She denies any congestion, runny nose, or coughing. She states that she has been blowing her nose, but she has not felt that at all. She notes that it is more on her left side. She denies any exposure to loud noises.    Health maintenance  The patient reports that she is going to get her COVID-19 booster. She states that she has received the Moderna vaccine.      Review of Systems   HENT: Positive for tinnitus.      A review of systems was performed, and pertinent findings are noted in the HPI.    Objective   Vital Signs:   /90 (BP Location: Right arm, Patient Position: Sitting, Cuff Size: Adult)   Pulse 77   Temp 98.2 °F (36.8 °C) (Temporal)   Resp 20   Wt 126 kg (277 lb)   SpO2 99%   BMI 44.71 kg/m²     Body mass index is 44.71 kg/m².    Physical Exam  Constitutional:       General: She is not in acute distress.  HENT:      Head:  Normocephalic and atraumatic.      Right Ear: Tympanic membrane normal.      Left Ear: Tympanic membrane normal.      Ears:      Comments: Specific focus with her concerns with the ringing in the ears.  Eyes:      Extraocular Movements: Extraocular movements intact.      Pupils: Pupils are equal, round, and reactive to light.   Musculoskeletal:      Cervical back: Neck supple.   Neurological:      Mental Status: She is alert.            Assessment and Plan  Diagnoses and all orders for this visit:    1. Tinnitus or ringing of the ears.  - After review of history and physical, do not have any focal findings that would suggest cause.  - The differential diagnosis and management evaluation of tinnitus can be prolonged here so recommend continue observation as far as the frequency, timing of the ringing of their ears and maybe even additional situational and have a better history, so the provider can continue to monitor.    2. Hypertension.  - Her blood pressure has been well controlled.  - She is continued on the labetalol 100 mg twice a day, 1 tablet by mouth twice a day.  - Has had no side effects from the current medication.    3. Hyperlipidemia.  - Patient discussion in regards to emphasis on lifestyle modification, monitoring diet and she is very motivated so she will continue to observe this and is going into the new year here 2023 with continued goals and continued follow-up.        The patient is advised to follow up back in clinic in 4 to 6 months as needed or if any other concerns or change in in blood work or lab status.      Diagnoses and all orders for this visit:    1. Essential hypertension (Primary)    2. Hyperlipidemia, unspecified hyperlipidemia type          Transcribed from ambient dictation for Elliott Cooper MD by Dora Sena.  11/16/22   14:52 EST    Patient or patient representative verbalized consent to the visit recording.  I have personally performed the services described in this document  as transcribed by the above individual, and it is both accurate and complete.

## 2022-12-13 RX ORDER — PRENATAL VIT/IRON FUM/FOLIC AC 27MG-0.8MG
TABLET ORAL
Qty: 30 TABLET | Refills: 11 | Status: SHIPPED | OUTPATIENT
Start: 2022-12-13

## 2023-02-08 DIAGNOSIS — I10 ESSENTIAL HYPERTENSION: ICD-10-CM

## 2023-02-09 RX ORDER — LABETALOL 100 MG/1
TABLET, FILM COATED ORAL
Qty: 60 TABLET | Refills: 3 | Status: SHIPPED | OUTPATIENT
Start: 2023-02-09

## 2023-02-09 NOTE — TELEPHONE ENCOUNTER
LOV 11/16/2022  NOV     Left message on machine for patient to call    HUB please inform patient    Patient is due for a follow up.  Please advise patient she needs to schedule and keep her appointment to continue to receive refills in the future.  If she is unable to keep her appointment we will not be able to provider further refills.  Once appointment has been scheduled please update message with date and time so we can process the request.  We will forward the message to the provider to review the refill request.

## 2023-02-09 NOTE — TELEPHONE ENCOUNTER
Provider: DR. PINEDO     Caller: NAHUM MUSAENDALL     Phone Number: [phone]    Reason for Call: PATIENT IS CALLING BACK AND STATES SHE HAD APPOINTMENTS WITH DR. PINEDO BUT THEY WERE CANCELED BY THE PROVIDER SO SHE HAD AN APPOINTMENT WITH DR. LYONS AND HER LAST APPOINTMENT WAS 11/16/22 SO SHE DOES NOT UNDERSTAND WHY SHE NEEDS TO MAKE ANOTHER APPOINTMENT TO GET THE MEDICATION REFILLED. SHE WILL BE OUT IN A COUPLE DAYS

## 2023-02-24 DIAGNOSIS — M54.50 LOW BACK PAIN, UNSPECIFIED BACK PAIN LATERALITY, UNSPECIFIED CHRONICITY, UNSPECIFIED WHETHER SCIATICA PRESENT: Primary | ICD-10-CM

## 2023-02-24 RX ORDER — CYCLOBENZAPRINE HCL 5 MG
TABLET ORAL
Qty: 60 TABLET | Refills: 0 | Status: SHIPPED | OUTPATIENT
Start: 2023-02-24

## 2023-03-11 ENCOUNTER — TELEPHONE (OUTPATIENT)
Dept: INTERNAL MEDICINE | Facility: CLINIC | Age: 43
End: 2023-03-11
Payer: COMMERCIAL

## 2023-03-11 NOTE — TELEPHONE ENCOUNTER
"----- Message from Mitzy White CMA sent at 3/9/2023  4:01 PM EST -----  Regarding: FW: ANGELICA paperwork   Contact: [phone]    ----- Message -----  From: Anne Marie May \"Anne Marie May\"  Sent: 3/9/2023   3:40 PM EST  [facility] [place]  Subject: ANGELICA paperwork                                   Attached please find paperwork as mentioned in the previous message. Thank you  Anne Marie"

## 2023-03-15 NOTE — TELEPHONE ENCOUNTER
Called patient and let her know  said he will have it done by Monday. She verbalized appreciation.

## 2023-04-10 DIAGNOSIS — M54.50 LOW BACK PAIN, UNSPECIFIED BACK PAIN LATERALITY, UNSPECIFIED CHRONICITY, UNSPECIFIED WHETHER SCIATICA PRESENT: ICD-10-CM

## 2023-04-11 RX ORDER — CYCLOBENZAPRINE HCL 5 MG
TABLET ORAL
Qty: 60 TABLET | Refills: 0 | Status: SHIPPED | OUTPATIENT
Start: 2023-04-11

## 2023-06-14 DIAGNOSIS — I10 ESSENTIAL HYPERTENSION: ICD-10-CM

## 2023-06-14 RX ORDER — LABETALOL 100 MG/1
100 TABLET, FILM COATED ORAL 2 TIMES DAILY
Qty: 60 TABLET | Refills: 5 | Status: SHIPPED | OUTPATIENT
Start: 2023-06-14

## 2023-06-14 NOTE — TELEPHONE ENCOUNTER
Caller: [place] [id number] [id number] - [phone] [facility] [phone] FX    Relationship: Pharmacy    Best call back number: [phone]    Requested Prescriptions:   Requested Prescriptions     Pending Prescriptions Disp Refills    labetalol (NORMODYNE) 100 MG tablet 60 tablet 3     Sig: Take 1 tablet by mouth 2 (Two) Times a Day.        Pharmacy where request should be sent: [place] [id number] [id number] - [phone] [facility] [phone] FX     Last office visit with prescribing clinician: 10/19/2021   Last telemedicine visit with prescribing clinician: Visit date not found   Next office visit with prescribing clinician: 7/10/2023     Additional details provided by patient: THE PHARMACY THINKS THE PATIENT MAY HAVE LESS THAN THREE DAYS LEFT     Does the patient have less than a 3 day supply:  [x] Yes  [] No    Would you like a call back once the refill request has been completed: [] Yes [x] No    If the office needs to give you a call back, can they leave a voicemail: [] Yes [x] No    Clay Win Rep   06/14/23 08:44 EDT

## 2023-08-22 ENCOUNTER — OFFICE VISIT (OUTPATIENT)
Dept: INTERNAL MEDICINE | Facility: CLINIC | Age: 43
End: 2023-08-22
Payer: COMMERCIAL

## 2023-08-22 VITALS
DIASTOLIC BLOOD PRESSURE: 84 MMHG | WEIGHT: 270 LBS | HEART RATE: 76 BPM | RESPIRATION RATE: 18 BRPM | BODY MASS INDEX: 45.99 KG/M2 | TEMPERATURE: 98.4 F | SYSTOLIC BLOOD PRESSURE: 132 MMHG

## 2023-08-22 DIAGNOSIS — I10 ESSENTIAL HYPERTENSION: Primary | ICD-10-CM

## 2023-08-22 LAB
ANION GAP SERPL CALCULATED.3IONS-SCNC: 11.1 MMOL/L (ref 5–15)
BUN SERPL-MCNC: 10 MG/DL (ref 6–20)
BUN/CREAT SERPL: 16.1 (ref 7–25)
CALCIUM SPEC-SCNC: 9.7 MG/DL (ref 8.6–10.5)
CHLORIDE SERPL-SCNC: 103 MMOL/L (ref 98–107)
CO2 SERPL-SCNC: 25.9 MMOL/L (ref 22–29)
CREAT SERPL-MCNC: 0.62 MG/DL (ref 0.57–1)
EGFRCR SERPLBLD CKD-EPI 2021: 112.1 ML/MIN/1.73
GLUCOSE SERPL-MCNC: 78 MG/DL (ref 65–99)
POTASSIUM SERPL-SCNC: 3.5 MMOL/L (ref 3.5–5.2)
SODIUM SERPL-SCNC: 140 MMOL/L (ref 136–145)

## 2023-08-22 PROCEDURE — 99213 OFFICE O/P EST LOW 20 MIN: CPT | Performed by: INTERNAL MEDICINE

## 2023-08-22 PROCEDURE — 80048 BASIC METABOLIC PNL TOTAL CA: CPT | Performed by: INTERNAL MEDICINE

## 2023-08-22 NOTE — PROGRESS NOTES
Chief Complaint   Patient presents with    Follow-up     1 month follow up hypertension         History of Present Illness    The patient presents for a follow-up related to hypertension. The patient reports that she has had no headaches, chest pain, dyspnea, edema, syncope, blurred vision or palpitations. She states that she is taking her medication as prescribed. She is not having medication side effects.    Medications      Current Outpatient Medications:     cyclobenzaprine (FLEXERIL) 5 MG tablet, TAKE ONE TO TWO TABLETS BY MOUTH NIGHTLY AS NEEDED, Disp: 60 tablet, Rfl: 0    losartan-hydrochlorothiazide (Hyzaar) 50-12.5 MG per tablet, Take 1 tablet by mouth Daily., Disp: 30 tablet, Rfl: 5    pregabalin (LYRICA) 75 MG capsule, Take 1 capsule by mouth 2 (Two) Times a Day., Disp: , Rfl:     Prenatal Vit-Fe Fumarate-FA (prenatal vitamin 27-0.8) 27-0.8 MG tablet tablet, TAKE 1 TABLET BY MOUTH DAILY, Disp: 30 tablet, Rfl: 11    propranolol (INDERAL) 10 MG tablet, Take 1 tablet by mouth 2 (Two) Times a Day As Needed (anxiety)., Disp: 60 tablet, Rfl: 2     Allergies    No Known Allergies    Problem List    Patient Active Problem List   Diagnosis    Iron deficiency anemia    Adverse effect of iron or its compound    Vitamin D deficiency    Health care maintenance    Low back pain    Herniation of intervertebral disc of lumbar spine    Degenerative lumbar disc    Essential hypertension    Obesity       Medications, Allergies, Problems List and Past History were reviewed and updated.    Physical Examination    /84   Pulse 76   Temp 98.4 øF (36.9 øC) (Infrared)   Resp 18   Wt 122 kg (270 lb)   LMP 08/03/2023 (Exact Date)   BMI 45.99 kg/mý       Neck: Thyroid- non enlarged, symmetric and has no nodules. No bruits are detected.    Lungs: Auscultation- Clear to auscultation bilaterally. There are no retractions, clubbing or cyanosis. The Expiratory to Inspiratory ratio is equal.    Cardiovascular: There are no  carotid bruits. Heart- Normal Rate with Regular rhythm and no murmurs. There are no gallops. There are no rubs. In the lower extremities there is no edema. The upper extremities do not have edema.    Abdomen: Soft, benign, non-tender with no masses, hernias, organomegaly or scars.    Impression and Assessment    Essential Hypertension.    Plan    Essential Hypertension Plan: The patient was instructed to continue the current medications.    Diagnoses and all orders for this visit:    1. Essential hypertension (Primary)  -     Basic Metabolic Panel; Future        Return to Office    The patient was instructed to return for follow-up in 1 month. The patient was instructed to return sooner if the condition changes, worsens, or does not resolve.         Class 3 Severe Obesity (BMI >=40). Obesity-related health conditions include the following: hypertension. Obesity is improving with lifestyle modifications. BMI is is above average; BMI management plan is completed. We discussed low calorie, low carb based diet program, portion control, and increasing exercise.

## 2023-11-29 ENCOUNTER — OFFICE VISIT (OUTPATIENT)
Dept: INTERNAL MEDICINE | Facility: CLINIC | Age: 43
End: 2023-11-29
Payer: COMMERCIAL

## 2023-11-29 VITALS
SYSTOLIC BLOOD PRESSURE: 128 MMHG | WEIGHT: 273 LBS | DIASTOLIC BLOOD PRESSURE: 88 MMHG | HEART RATE: 84 BPM | RESPIRATION RATE: 18 BRPM | BODY MASS INDEX: 46.5 KG/M2 | TEMPERATURE: 98.6 F

## 2023-11-29 DIAGNOSIS — E78.5 HYPERLIPIDEMIA, UNSPECIFIED HYPERLIPIDEMIA TYPE: ICD-10-CM

## 2023-11-29 DIAGNOSIS — D50.9 IRON DEFICIENCY ANEMIA, UNSPECIFIED IRON DEFICIENCY ANEMIA TYPE: ICD-10-CM

## 2023-11-29 DIAGNOSIS — I10 ESSENTIAL HYPERTENSION: Primary | ICD-10-CM

## 2023-11-29 DIAGNOSIS — Z23 IMMUNIZATION DUE: ICD-10-CM

## 2023-11-29 PROCEDURE — 80053 COMPREHEN METABOLIC PANEL: CPT | Performed by: INTERNAL MEDICINE

## 2023-11-29 PROCEDURE — 80061 LIPID PANEL: CPT | Performed by: INTERNAL MEDICINE

## 2023-11-29 PROCEDURE — 85025 COMPLETE CBC W/AUTO DIFF WBC: CPT | Performed by: INTERNAL MEDICINE

## 2023-11-29 NOTE — PROGRESS NOTES
Chief Complaint   Patient presents with    Follow-up     4 month follow up chronic medical problems       History of Present Illness      The patient presents for a follow-up related to hypertension. The patient reports that she has had no headaches, chest pain, dyspnea, edema, syncope, blurred vision or palpitations. She states that she is taking her medication as prescribed. She is not having medication side effects.    The patient presents for a follow-up related to hyperlipidemia. She is not following a low fat diet. She reports that she is exercising. She is not taking medication for hyperlipidemia. She denies orthopnea, paroxysmal nocturnal dyspnea or dyspnea on exertion.    The patient presents for a follow-up related to iron deficiency anemia. There are no reports of blood loss. The patient has no symptoms of a dry cough, a wet cough, wheezing, fever, nausea, vomiting, diarrhea, myalgias, abdominal pain, sweats, weight loss, decreased appetite, chills, fatigue or paresthesias. The patient's energy level is normal.    Medications      Current Outpatient Medications:     cyclobenzaprine (FLEXERIL) 5 MG tablet, TAKE ONE TO TWO TABLETS BY MOUTH NIGHTLY AS NEEDED, Disp: 60 tablet, Rfl: 0    losartan-hydrochlorothiazide (Hyzaar) 50-12.5 MG per tablet, Take 1 tablet by mouth Daily., Disp: 30 tablet, Rfl: 5    pregabalin (LYRICA) 75 MG capsule, Take 1 capsule by mouth 2 (Two) Times a Day., Disp: , Rfl:     Prenatal Vit-Fe Fumarate-FA (prenatal vitamin 27-0.8) 27-0.8 MG tablet tablet, TAKE 1 TABLET BY MOUTH DAILY, Disp: 30 tablet, Rfl: 11    propranolol (INDERAL) 10 MG tablet, Take 1 tablet by mouth 2 (Two) Times a Day As Needed (anxiety)., Disp: 60 tablet, Rfl: 2     Allergies    No Known Allergies    Problem List    Patient Active Problem List   Diagnosis    Iron deficiency anemia    Adverse effect of iron or its compound    Vitamin D deficiency    Health care maintenance    Low back pain    Herniation of  intervertebral disc of lumbar spine    Degenerative lumbar disc    Essential hypertension    Obesity       Medications, Allergies, Problems List and Past History were reviewed and updated.    Physical Examination    /88 (BP Location: Right arm, Patient Position: Sitting, Cuff Size: Adult)   Pulse 84   Temp 98.6 °F (37 °C) (Infrared)   Resp 18   Wt 124 kg (273 lb)   LMP 11/21/2023 (Exact Date)   BMI 46.50 kg/m²       HEENT: Head- Normocephalic Atraumatic. Facies- Within normal limits. Pinnas- Normal texture and shape bilaterally. Canals- Normal bilaterally. TMs- Normal bilaterally. Nares- Patent bilaterally. Nasal Septum- is normal. There is no tenderness to palpation over the frontal or maxillary sinuses. Lids- Normal bilaterally. Conjunctiva- Clear bilaterally. Sclera- Anicteric bilaterally. Oropharynx- Moist with no lesions. Tonsils- No enlargement, erythema or exudate.    Neck: Thyroid- non enlarged, symmetric and has no nodules. No bruits are detected. ROM- Normal Range of Motion with no rigidity.    Lungs: Auscultation- Clear to auscultation bilaterally. There are no retractions, clubbing or cyanosis. The Expiratory to Inspiratory ratio is equal.    Lymph Nodes: Cervical- no enlarged lymph nodes noted. Clavicular- Deferred. Axillary- Deferred. Inguinal- Deferred.    Cardiovascular: There are no carotid bruits. Heart- Normal Rate with Regular rhythm and no murmurs. There are no gallops. There are no rubs. In the lower extremities there is no edema. The upper extremities do not have edema.    Abdomen: Soft, benign, non-tender with no masses, hernias, organomegaly or scars.    Impression and Assessment    Encounter for Immunization Administration.    Essential Hypertension.    Hyperlipidemia.    Iron Deficiency Anemia.    Plan    Essential Hypertension Plan: Weight loss was encouraged. The patient was instructed to continue the current medications.    Hyperlipidemia Plan: She was instructed to eat a  low fat diet. She was encouraged to exercise daily.    Iron Deficiency Anemia Plan: Further plans will be made after results of tests are available.    Counseled regarding immunizations and applicable VIS given.    Immunizations Ordered and Administered: Influenza.    Diagnoses and all orders for this visit:    1. Essential hypertension (Primary)  -     CBC & Differential; Future  -     Comprehensive Metabolic Panel; Future  -     Comprehensive Metabolic Panel  -     CBC & Differential    2. Hyperlipidemia, unspecified hyperlipidemia type  -     Comprehensive Metabolic Panel; Future  -     Lipid Panel; Future  -     Lipid Panel  -     Comprehensive Metabolic Panel    3. Iron deficiency anemia, unspecified iron deficiency anemia type  -     CBC & Differential; Future  -     CBC & Differential    4. Immunization due  -     Fluzone (or Fluarix & Flulaval for VFC) >6mos          Return to Office    The patient was instructed to return for follow-up in 6 months. The patient was instructed to return sooner if the condition changes, worsens, or does not resolve.

## 2023-11-30 LAB
ALBUMIN SERPL-MCNC: 4.2 G/DL (ref 3.5–5.2)
ALBUMIN/GLOB SERPL: 1.4 G/DL
ALP SERPL-CCNC: 77 U/L (ref 39–117)
ALT SERPL W P-5'-P-CCNC: 15 U/L (ref 1–33)
ANION GAP SERPL CALCULATED.3IONS-SCNC: 12.2 MMOL/L (ref 5–15)
AST SERPL-CCNC: 22 U/L (ref 1–32)
BASOPHILS # BLD AUTO: 0.05 10*3/MM3 (ref 0–0.2)
BASOPHILS NFR BLD AUTO: 0.7 % (ref 0–1.5)
BILIRUB SERPL-MCNC: <0.2 MG/DL (ref 0–1.2)
BUN SERPL-MCNC: 13 MG/DL (ref 6–20)
BUN/CREAT SERPL: 21.3 (ref 7–25)
CALCIUM SPEC-SCNC: 9.3 MG/DL (ref 8.6–10.5)
CHLORIDE SERPL-SCNC: 100 MMOL/L (ref 98–107)
CHOLEST SERPL-MCNC: 246 MG/DL (ref 0–200)
CO2 SERPL-SCNC: 26.8 MMOL/L (ref 22–29)
CREAT SERPL-MCNC: 0.61 MG/DL (ref 0.57–1)
DEPRECATED RDW RBC AUTO: 40 FL (ref 37–54)
EGFRCR SERPLBLD CKD-EPI 2021: 112.5 ML/MIN/1.73
EOSINOPHIL # BLD AUTO: 0.13 10*3/MM3 (ref 0–0.4)
EOSINOPHIL NFR BLD AUTO: 1.7 % (ref 0.3–6.2)
ERYTHROCYTE [DISTWIDTH] IN BLOOD BY AUTOMATED COUNT: 12.3 % (ref 12.3–15.4)
GLOBULIN UR ELPH-MCNC: 3 GM/DL
GLUCOSE SERPL-MCNC: 81 MG/DL (ref 65–99)
HCT VFR BLD AUTO: 37.1 % (ref 34–46.6)
HDLC SERPL-MCNC: 46 MG/DL (ref 40–60)
HGB BLD-MCNC: 12.5 G/DL (ref 12–15.9)
IMM GRANULOCYTES # BLD AUTO: 0.02 10*3/MM3 (ref 0–0.05)
IMM GRANULOCYTES NFR BLD AUTO: 0.3 % (ref 0–0.5)
LDLC SERPL CALC-MCNC: 161 MG/DL (ref 0–100)
LDLC/HDLC SERPL: 3.43 {RATIO}
LYMPHOCYTES # BLD AUTO: 1.68 10*3/MM3 (ref 0.7–3.1)
LYMPHOCYTES NFR BLD AUTO: 22.2 % (ref 19.6–45.3)
MCH RBC QN AUTO: 30.6 PG (ref 26.6–33)
MCHC RBC AUTO-ENTMCNC: 33.7 G/DL (ref 31.5–35.7)
MCV RBC AUTO: 90.9 FL (ref 79–97)
MONOCYTES # BLD AUTO: 0.71 10*3/MM3 (ref 0.1–0.9)
MONOCYTES NFR BLD AUTO: 9.4 % (ref 5–12)
NEUTROPHILS NFR BLD AUTO: 4.97 10*3/MM3 (ref 1.7–7)
NEUTROPHILS NFR BLD AUTO: 65.7 % (ref 42.7–76)
NRBC BLD AUTO-RTO: 0 /100 WBC (ref 0–0.2)
PLATELET # BLD AUTO: 356 10*3/MM3 (ref 140–450)
PMV BLD AUTO: 10.4 FL (ref 6–12)
POTASSIUM SERPL-SCNC: 3.8 MMOL/L (ref 3.5–5.2)
PROT SERPL-MCNC: 7.2 G/DL (ref 6–8.5)
RBC # BLD AUTO: 4.08 10*6/MM3 (ref 3.77–5.28)
SODIUM SERPL-SCNC: 139 MMOL/L (ref 136–145)
TRIGL SERPL-MCNC: 211 MG/DL (ref 0–150)
VLDLC SERPL-MCNC: 39 MG/DL (ref 5–40)
WBC NRBC COR # BLD AUTO: 7.56 10*3/MM3 (ref 3.4–10.8)

## 2024-01-03 DIAGNOSIS — I10 ESSENTIAL HYPERTENSION: ICD-10-CM

## 2024-01-03 RX ORDER — LOSARTAN POTASSIUM AND HYDROCHLOROTHIAZIDE 12.5; 5 MG/1; MG/1
1 TABLET ORAL DAILY
Qty: 90 TABLET | Refills: 0 | Status: SHIPPED | OUTPATIENT
Start: 2024-01-03 | End: 2024-01-03 | Stop reason: SDUPTHER

## 2024-01-03 RX ORDER — LOSARTAN POTASSIUM AND HYDROCHLOROTHIAZIDE 12.5; 5 MG/1; MG/1
1 TABLET ORAL DAILY
Qty: 90 TABLET | Refills: 0 | Status: SHIPPED | OUTPATIENT
Start: 2024-01-03 | End: 2024-01-05 | Stop reason: SDUPTHER

## 2024-01-03 RX ORDER — PRENATAL VIT/IRON FUM/FOLIC AC 27MG-0.8MG
1 TABLET ORAL DAILY
Qty: 30 TABLET | Refills: 11 | Status: SHIPPED | OUTPATIENT
Start: 2024-01-03

## 2024-01-05 ENCOUNTER — TELEPHONE (OUTPATIENT)
Dept: INTERNAL MEDICINE | Facility: CLINIC | Age: 44
End: 2024-01-05
Payer: COMMERCIAL

## 2024-01-05 DIAGNOSIS — I10 ESSENTIAL HYPERTENSION: ICD-10-CM

## 2024-01-05 RX ORDER — LOSARTAN POTASSIUM AND HYDROCHLOROTHIAZIDE 12.5; 5 MG/1; MG/1
1 TABLET ORAL DAILY
Qty: 90 TABLET | Refills: 0 | Status: SHIPPED | OUTPATIENT
Start: 2024-01-05

## 2024-01-05 NOTE — TELEPHONE ENCOUNTER
Pharmacy Name: [facility] - [place], [place] - [id number] - [phone] [facility] [phone]      REZA    Pharmacy representative phone number: [phone]    What medication are you calling in regards to: LOSARTAN HCTZ    What question does the pharmacy have: THE PHARMACY STATES THAT THEY RECEIVED AN ELECTRONIC PRESCRIPTION BUT THE IMAGE DID NOT GO THROUGH THEY WOULD LIKE THAT TO BE RESENT     Who is the provider that prescribed the medication: DOCTOR PINEDO

## 2024-01-23 ENCOUNTER — TELEPHONE (OUTPATIENT)
Dept: INTERNAL MEDICINE | Facility: CLINIC | Age: 44
End: 2024-01-23
Payer: COMMERCIAL

## 2024-01-23 DIAGNOSIS — Z00.00 HEALTHCARE MAINTENANCE: Primary | ICD-10-CM

## 2024-01-23 NOTE — TELEPHONE ENCOUNTER
I have signed her form.  It is on your desk.    I have put in orders for her labs.  She can stop in.    Nic Greer MD  09:02 EST  01/23/24

## 2024-01-23 NOTE — TELEPHONE ENCOUNTER
"----- Message from Iliana Ny RN sent at 1/23/2024  8:35 AM EST -----  Regarding: FW: MSN Clinicals Medical Exam Form and Labs  Contact: [phone]    ----- Message -----  From: Anne Marie May \"Anne Marie May\"  Sent: 1/23/2024   7:20 AM EST  To: Leighton Espinosa [facility]  Subject: MSN Clinicals Medical Exam Form and Labs         Hi Dr. 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 I also am required to have titers for MMR, Varicella and Hep B completed and was hoping you could place those orders for me. I appreciate if you are able to help with this and let me know if you have any questions.   Thank you,  Anne Marie"

## 2024-01-23 NOTE — TELEPHONE ENCOUNTER
Left message voicemail for patient to please return call.  Ofc # given.       HUB: Please relay that Dr Greer has signed her form and it is at the  ready for  and that he has placed orders for her labs and she can stop in for labs at her convenience Monday - Friday 8a-4p.     Document if notified.

## 2024-01-23 NOTE — TELEPHONE ENCOUNTER
HUB PROVIDED THE RELAY MESSAGE FROM THE OFFICE      PATIENT: VOICED UNDERSTANDING AND HAS NO FURTHER QUESTIONS AT THIS TIME    ADDITIONAL INFORMATION:

## 2024-01-29 ENCOUNTER — LAB (OUTPATIENT)
Dept: INTERNAL MEDICINE | Facility: CLINIC | Age: 44
End: 2024-01-29
Payer: COMMERCIAL

## 2024-01-29 DIAGNOSIS — Z00.00 HEALTHCARE MAINTENANCE: ICD-10-CM

## 2024-01-29 LAB — HBV SURFACE AB SER RIA-ACNC: REACTIVE

## 2024-01-29 PROCEDURE — 86762 RUBELLA ANTIBODY: CPT | Performed by: INTERNAL MEDICINE

## 2024-01-29 PROCEDURE — 86787 VARICELLA-ZOSTER ANTIBODY: CPT | Performed by: INTERNAL MEDICINE

## 2024-01-29 PROCEDURE — 36415 COLL VENOUS BLD VENIPUNCTURE: CPT | Performed by: INTERNAL MEDICINE

## 2024-01-29 PROCEDURE — 86706 HEP B SURFACE ANTIBODY: CPT | Performed by: INTERNAL MEDICINE

## 2024-01-29 PROCEDURE — 86735 MUMPS ANTIBODY: CPT | Performed by: INTERNAL MEDICINE

## 2024-01-29 PROCEDURE — 86765 RUBEOLA ANTIBODY: CPT | Performed by: INTERNAL MEDICINE

## 2024-01-30 LAB
MEV IGG SER IA-ACNC: >300 AU/ML
MUV IGG SER IA-ACNC: >300 AU/ML
RUBV IGG SERPL IA-ACNC: 7.39 INDEX
VZV IGG SER IA-ACNC: 559 INDEX

## 2024-02-07 DIAGNOSIS — Z11.1 SCREENING-PULMONARY TB: Primary | ICD-10-CM

## 2024-02-12 ENCOUNTER — LAB (OUTPATIENT)
Dept: INTERNAL MEDICINE | Facility: CLINIC | Age: 44
End: 2024-02-12
Payer: COMMERCIAL

## 2024-02-12 DIAGNOSIS — Z11.1 SCREENING-PULMONARY TB: ICD-10-CM

## 2024-02-12 PROCEDURE — 86480 TB TEST CELL IMMUN MEASURE: CPT | Performed by: INTERNAL MEDICINE

## 2024-02-15 LAB
GAMMA INTERFERON BACKGROUND BLD IA-ACNC: 0.03 IU/ML
M TB IFN-G BLD-IMP: NEGATIVE
M TB IFN-G CD4+ BCKGRND COR BLD-ACNC: 0.07 IU/ML
M TB IFN-G CD4+CD8+ BCKGRND COR BLD-ACNC: 0.02 IU/ML
MITOGEN IGNF BCKGRD COR BLD-ACNC: >10 IU/ML
SERVICE CMNT-IMP: NORMAL

## 2024-04-05 DIAGNOSIS — F41.9 ANXIETY: ICD-10-CM

## 2024-04-05 RX ORDER — PROPRANOLOL HYDROCHLORIDE 10 MG/1
10 TABLET ORAL 2 TIMES DAILY PRN
Qty: 60 TABLET | Refills: 2 | Status: SHIPPED | OUTPATIENT
Start: 2024-04-05

## 2024-04-05 RX ORDER — PROPRANOLOL HYDROCHLORIDE 10 MG/1
10 TABLET ORAL 2 TIMES DAILY PRN
Qty: 60 TABLET | Refills: 2 | OUTPATIENT
Start: 2024-04-05

## 2024-06-04 ENCOUNTER — OFFICE VISIT (OUTPATIENT)
Dept: INTERNAL MEDICINE | Facility: CLINIC | Age: 44
End: 2024-06-04
Payer: COMMERCIAL

## 2024-06-04 VITALS
WEIGHT: 276 LBS | HEART RATE: 72 BPM | BODY MASS INDEX: 47.01 KG/M2 | RESPIRATION RATE: 18 BRPM | TEMPERATURE: 97.8 F | DIASTOLIC BLOOD PRESSURE: 84 MMHG | SYSTOLIC BLOOD PRESSURE: 118 MMHG

## 2024-06-04 DIAGNOSIS — I10 ESSENTIAL HYPERTENSION: Primary | ICD-10-CM

## 2024-06-04 DIAGNOSIS — F41.9 ANXIETY: ICD-10-CM

## 2024-06-04 DIAGNOSIS — D50.9 IRON DEFICIENCY ANEMIA, UNSPECIFIED IRON DEFICIENCY ANEMIA TYPE: ICD-10-CM

## 2024-06-04 DIAGNOSIS — E78.5 HYPERLIPIDEMIA, UNSPECIFIED HYPERLIPIDEMIA TYPE: ICD-10-CM

## 2024-06-04 DIAGNOSIS — M79.642 LEFT HAND PAIN: ICD-10-CM

## 2024-06-04 DIAGNOSIS — E55.9 VITAMIN D DEFICIENCY: ICD-10-CM

## 2024-06-04 LAB
25(OH)D3 SERPL-MCNC: 26.5 NG/ML (ref 30–100)
ALBUMIN SERPL-MCNC: 4.2 G/DL (ref 3.5–5.2)
ALBUMIN/GLOB SERPL: 1.4 G/DL
ALP SERPL-CCNC: 63 U/L (ref 39–117)
ALT SERPL W P-5'-P-CCNC: 12 U/L (ref 1–33)
ANION GAP SERPL CALCULATED.3IONS-SCNC: 6 MMOL/L (ref 5–15)
AST SERPL-CCNC: 14 U/L (ref 1–32)
BASOPHILS # BLD AUTO: 0.05 10*3/MM3 (ref 0–0.2)
BASOPHILS NFR BLD AUTO: 0.8 % (ref 0–1.5)
BILIRUB BLD-MCNC: NEGATIVE MG/DL
BILIRUB SERPL-MCNC: 0.2 MG/DL (ref 0–1.2)
BUN SERPL-MCNC: 11 MG/DL (ref 6–20)
BUN/CREAT SERPL: 16.7 (ref 7–25)
CALCIUM SPEC-SCNC: 9.1 MG/DL (ref 8.6–10.5)
CHLORIDE SERPL-SCNC: 104 MMOL/L (ref 98–107)
CHOLEST SERPL-MCNC: 218 MG/DL (ref 0–200)
CLARITY, POC: CLEAR
CO2 SERPL-SCNC: 29 MMOL/L (ref 22–29)
COLOR UR: YELLOW
CREAT SERPL-MCNC: 0.66 MG/DL (ref 0.57–1)
DEPRECATED RDW RBC AUTO: 40.2 FL (ref 37–54)
EGFRCR SERPLBLD CKD-EPI 2021: 109.7 ML/MIN/1.73
EOSINOPHIL # BLD AUTO: 0.18 10*3/MM3 (ref 0–0.4)
EOSINOPHIL NFR BLD AUTO: 2.8 % (ref 0.3–6.2)
ERYTHROCYTE [DISTWIDTH] IN BLOOD BY AUTOMATED COUNT: 12.3 % (ref 12.3–15.4)
EXPIRATION DATE: ABNORMAL
FERRITIN SERPL-MCNC: 10 NG/ML (ref 13–150)
GLOBULIN UR ELPH-MCNC: 3 GM/DL
GLUCOSE SERPL-MCNC: 94 MG/DL (ref 65–99)
GLUCOSE UR STRIP-MCNC: NEGATIVE MG/DL
HCT VFR BLD AUTO: 37.3 % (ref 34–46.6)
HDLC SERPL-MCNC: 46 MG/DL (ref 40–60)
HGB BLD-MCNC: 12.2 G/DL (ref 12–15.9)
IMM GRANULOCYTES # BLD AUTO: 0.02 10*3/MM3 (ref 0–0.05)
IMM GRANULOCYTES NFR BLD AUTO: 0.3 % (ref 0–0.5)
IRON 24H UR-MRATE: 45 MCG/DL (ref 37–145)
IRON SATN MFR SERPL: 9 % (ref 20–50)
KETONES UR QL: NEGATIVE
LDLC SERPL CALC-MCNC: 140 MG/DL (ref 0–100)
LDLC/HDLC SERPL: 2.97 {RATIO}
LEUKOCYTE EST, POC: NEGATIVE
LYMPHOCYTES # BLD AUTO: 1.46 10*3/MM3 (ref 0.7–3.1)
LYMPHOCYTES NFR BLD AUTO: 22.4 % (ref 19.6–45.3)
Lab: ABNORMAL
MCH RBC QN AUTO: 29.5 PG (ref 26.6–33)
MCHC RBC AUTO-ENTMCNC: 32.7 G/DL (ref 31.5–35.7)
MCV RBC AUTO: 90.3 FL (ref 79–97)
MONOCYTES # BLD AUTO: 0.5 10*3/MM3 (ref 0.1–0.9)
MONOCYTES NFR BLD AUTO: 7.7 % (ref 5–12)
NEUTROPHILS NFR BLD AUTO: 4.32 10*3/MM3 (ref 1.7–7)
NEUTROPHILS NFR BLD AUTO: 66 % (ref 42.7–76)
NITRITE UR-MCNC: NEGATIVE MG/ML
NRBC BLD AUTO-RTO: 0 /100 WBC (ref 0–0.2)
PH UR: 6.5 [PH] (ref 5–8)
PLATELET # BLD AUTO: 308 10*3/MM3 (ref 140–450)
PMV BLD AUTO: 10.5 FL (ref 6–12)
POTASSIUM SERPL-SCNC: 4.5 MMOL/L (ref 3.5–5.2)
PROT SERPL-MCNC: 7.2 G/DL (ref 6–8.5)
PROT UR STRIP-MCNC: NEGATIVE MG/DL
RBC # BLD AUTO: 4.13 10*6/MM3 (ref 3.77–5.28)
RBC # UR STRIP: ABNORMAL /UL
SODIUM SERPL-SCNC: 139 MMOL/L (ref 136–145)
SP GR UR: 1 (ref 1–1.03)
TIBC SERPL-MCNC: 474 MCG/DL (ref 298–536)
TRANSFERRIN SERPL-MCNC: 318 MG/DL (ref 200–360)
TRIGL SERPL-MCNC: 178 MG/DL (ref 0–150)
UROBILINOGEN UR QL: NORMAL
VLDLC SERPL-MCNC: 32 MG/DL (ref 5–40)
WBC NRBC COR # BLD AUTO: 6.53 10*3/MM3 (ref 3.4–10.8)

## 2024-06-04 PROCEDURE — 82306 VITAMIN D 25 HYDROXY: CPT | Performed by: INTERNAL MEDICINE

## 2024-06-04 PROCEDURE — 84466 ASSAY OF TRANSFERRIN: CPT | Performed by: INTERNAL MEDICINE

## 2024-06-04 PROCEDURE — 82728 ASSAY OF FERRITIN: CPT | Performed by: INTERNAL MEDICINE

## 2024-06-04 PROCEDURE — 99214 OFFICE O/P EST MOD 30 MIN: CPT | Performed by: INTERNAL MEDICINE

## 2024-06-04 PROCEDURE — 80053 COMPREHEN METABOLIC PANEL: CPT | Performed by: INTERNAL MEDICINE

## 2024-06-04 PROCEDURE — 85025 COMPLETE CBC W/AUTO DIFF WBC: CPT | Performed by: INTERNAL MEDICINE

## 2024-06-04 PROCEDURE — 80061 LIPID PANEL: CPT | Performed by: INTERNAL MEDICINE

## 2024-06-04 PROCEDURE — 81003 URINALYSIS AUTO W/O SCOPE: CPT | Performed by: INTERNAL MEDICINE

## 2024-06-04 PROCEDURE — 83540 ASSAY OF IRON: CPT | Performed by: INTERNAL MEDICINE

## 2024-06-04 RX ORDER — MULTIPLE VITAMINS W/ MINERALS TAB 9MG-400MCG
1 TAB ORAL DAILY
COMMUNITY

## 2024-06-04 NOTE — PROGRESS NOTES
Chief Complaint   Patient presents with    Follow-up    6 month follow up chronic medical problems       History of Present Illness      The patient presents for a follow-up related to hypertension. The patient reports that she has had no headaches, chest pain, dyspnea, edema, syncope, blurred vision or palpitations. She states that she is taking her medication as prescribed. She is not having medication side effects.    The patient presents for a follow-up related to hyperlipidemia. She is following a low fat diet. She reports that she is exercising. She is not taking medication for hyperlipidemia. She denies orthopnea, paroxysmal nocturnal dyspnea or dyspnea on exertion.    The patient presents for a follow-up related to iron deficiency anemia. There are no reports of blood loss. The patient has no symptoms of a dry cough, a wet cough, wheezing, fever, nausea, vomiting, diarrhea, myalgias, abdominal pain, sweats, weight loss, decreased appetite, chills, fatigue or paresthesias. The patient's energy level is normal.    The patient presents for a follow-up related to anxiety. She denies currently having anxiety symptoms. She is not having panic attacks. Her energy level is good. She denies agorophobia. She sleeps well. She is not on a medication. She states that her current anxiety symptoms are stable.    The patient presents with pain in the left hand of longer than one months duration. The patient has no joint swelling. There is no stiffness. The patient denies a history of overuse or repetitive motion with the affected joints.    The joint pain is aggravated by motion. The pain has no alleviating factors noted.    Medications      Current Outpatient Medications:     cyclobenzaprine (FLEXERIL) 5 MG tablet, TAKE ONE TO TWO TABLETS BY MOUTH NIGHTLY AS NEEDED, Disp: 60 tablet, Rfl: 0    losartan-hydrochlorothiazide (HYZAAR) 50-12.5 MG per tablet, Take 1 tablet by mouth Daily., Disp: 90 tablet, Rfl: 0    multivitamin  with minerals (MULTIVITAMIN ADULT PO), Take 1 tablet by mouth Daily., Disp: , Rfl:     pregabalin (LYRICA) 75 MG capsule, Take 1 capsule by mouth 2 (Two) Times a Day., Disp: , Rfl:     propranolol (INDERAL) 10 MG tablet, TAKE 1 TABLET BY MOUTH TWO TIMES A DAY AS NEEDED, Disp: 60 tablet, Rfl: 2     Allergies    No Known Allergies    Problem List    Patient Active Problem List   Diagnosis    Iron deficiency anemia    Adverse effect of iron or its compound    Vitamin D deficiency    Health care maintenance    Low back pain    Herniation of intervertebral disc of lumbar spine    Degenerative lumbar disc    Essential hypertension    Obesity       Medications, Allergies, Problems List and Past History were reviewed and updated.    Physical Examination    /84 (BP Location: Left arm, Patient Position: Sitting, Cuff Size: Adult)   Pulse 72   Temp 97.8 °F (36.6 °C) (Infrared)   Resp 18   Wt 125 kg (276 lb)   LMP 06/03/2024 (Exact Date)   BMI 47.01 kg/m²       HEENT: Head- Normocephalic Atraumatic. Facies- Within normal limits. Pinnas- Normal texture and shape bilaterally. Canals- Normal bilaterally. TMs- Normal bilaterally. Nares- Patent bilaterally. Nasal Septum- is normal. There is no tenderness to palpation over the frontal or maxillary sinuses. Lids- Normal bilaterally. Conjunctiva- Clear bilaterally. Sclera- Anicteric bilaterally. Oropharynx- Moist with no lesions. Tonsils- No enlargement, erythema or exudate.    Neck: Thyroid- non enlarged, symmetric and has no nodules. No bruits are detected. ROM- Normal Range of Motion with no rigidity.    Lungs: Auscultation- Clear to auscultation bilaterally. There are no retractions, clubbing or cyanosis. The Expiratory to Inspiratory ratio is equal.    Lymph Nodes: Cervical- no enlarged lymph nodes noted. Clavicular- Deferred. Axillary- Deferred. Inguinal- Deferred.    Cardiovascular: There are no carotid bruits. Heart- Normal Rate with Regular rhythm and no murmurs.  There are no gallops. There are no rubs. In the lower extremities there is no edema. The upper extremities do not have edema.    Abdomen: Soft, benign, non-tender with no masses, hernias, organomegaly or scars.    Breast: Normal contours bilaterally with no masses, discharge, skin changes or lumps. There are no scars noted.    Hands: The left hand has normal sudheer landmarks and no noted atrophy. There are no Rose nodules noted on the PIP joints. There are no Heberden's nodes noted on the DIP joints. There is a normal range of motion of the left hand. The left thumb abductors (abductor pollicis longus) are normal with no tenderness or swelling. There is no evidence of tenosynovitis. There are no neurovascular deficits. There is no tenderness noted in the hands. There are no contractures noted. There are no ingrown nails. The nail beds are normal.    Dermatologic: The patient has no worrisome or suspicious skin lesions noted.    Impression and Assessment    Essential Hypertension.    Hyperlipidemia.    Iron Deficiency Anemia.    Anxiety Disorder Unspecified.    Left Hand Pain.    Plan    Essential Hypertension Plan: The patient was instructed to continue the current medications.    Hyperlipidemia Plan: The patient was instructed to exercise daily, eat a low fat diet and continue her medications.    Left Hand Pain Plan: Further plans will be made after the test results are received and reviewed.    Anxiety Disorder Unspecified Plan: The condition is stable. No change is needed in the current plan.    Iron Deficiency Anemia Plan: Further plans will be made after results of tests are available.    Diagnoses and all orders for this visit:    1. Essential hypertension (Primary)  -     CBC & Differential; Future  -     Comprehensive Metabolic Panel; Future  -     POC Urinalysis Dipstick, Automated; Future    2. Hyperlipidemia, unspecified hyperlipidemia type  -     Comprehensive Metabolic Panel; Future  -     Lipid  Panel; Future    3. Iron deficiency anemia, unspecified iron deficiency anemia type  -     CBC & Differential; Future  -     Ferritin; Future  -     Iron Profile; Future    4. Anxiety    5. Vitamin D deficiency  -     Vitamin D,25-Hydroxy; Future    6. Left hand pain  -     XR Hand 3+ View Left; Future    Class 3 Severe Obesity (BMI >=40). Obesity-related health conditions include the following: hypertension and dyslipidemias. Obesity is unchanged. BMI is is above average; BMI management plan is completed. We discussed low calorie, low carb based diet program, portion control, increasing exercise, and Information on healthy weight added to patient's after visit summary.      Return to Office    The patient was instructed to return for follow-up in 6 months. The patient was instructed to return sooner if the condition changes, worsens, or does not resolve.

## 2024-06-30 DIAGNOSIS — I10 ESSENTIAL HYPERTENSION: ICD-10-CM

## 2024-07-01 RX ORDER — LOSARTAN POTASSIUM AND HYDROCHLOROTHIAZIDE 12.5; 5 MG/1; MG/1
1 TABLET ORAL DAILY
Qty: 90 TABLET | Refills: 0 | Status: SHIPPED | OUTPATIENT
Start: 2024-07-01

## 2024-09-24 DIAGNOSIS — I10 ESSENTIAL HYPERTENSION: ICD-10-CM

## 2024-09-24 DIAGNOSIS — F41.9 ANXIETY: ICD-10-CM

## 2024-09-24 RX ORDER — PROPRANOLOL HCL 10 MG
10 TABLET ORAL 2 TIMES DAILY
Qty: 60 TABLET | Refills: 2 | Status: SHIPPED | OUTPATIENT
Start: 2024-09-24

## 2024-09-24 RX ORDER — PREGABALIN 75 MG/1
75 CAPSULE ORAL 2 TIMES DAILY
Qty: 60 CAPSULE | Refills: 2 | Status: SHIPPED | OUTPATIENT
Start: 2024-09-24

## 2024-09-24 RX ORDER — LOSARTAN POTASSIUM AND HYDROCHLOROTHIAZIDE 12.5; 5 MG/1; MG/1
1 TABLET ORAL DAILY
Qty: 90 TABLET | Refills: 0 | Status: SHIPPED | OUTPATIENT
Start: 2024-09-24

## 2024-10-23 DIAGNOSIS — M54.50 LOW BACK PAIN, UNSPECIFIED BACK PAIN LATERALITY, UNSPECIFIED CHRONICITY, UNSPECIFIED WHETHER SCIATICA PRESENT: ICD-10-CM

## 2024-10-23 RX ORDER — CYCLOBENZAPRINE HCL 5 MG
TABLET ORAL
Qty: 60 TABLET | Refills: 0 | Status: SHIPPED | OUTPATIENT
Start: 2024-10-23

## 2024-12-04 ENCOUNTER — OFFICE VISIT (OUTPATIENT)
Dept: INTERNAL MEDICINE | Facility: CLINIC | Age: 44
End: 2024-12-04
Payer: COMMERCIAL

## 2024-12-04 VITALS
DIASTOLIC BLOOD PRESSURE: 88 MMHG | RESPIRATION RATE: 18 BRPM | WEIGHT: 278 LBS | TEMPERATURE: 98.9 F | SYSTOLIC BLOOD PRESSURE: 136 MMHG | HEIGHT: 65 IN | HEART RATE: 72 BPM | BODY MASS INDEX: 46.32 KG/M2

## 2024-12-04 DIAGNOSIS — M79.672 LEFT FOOT PAIN: ICD-10-CM

## 2024-12-04 DIAGNOSIS — Z79.899 HIGH RISK MEDICATION USE: ICD-10-CM

## 2024-12-04 DIAGNOSIS — E78.5 HYPERLIPIDEMIA, UNSPECIFIED HYPERLIPIDEMIA TYPE: ICD-10-CM

## 2024-12-04 DIAGNOSIS — Z12.31 ENCOUNTER FOR SCREENING MAMMOGRAM FOR BREAST CANCER: ICD-10-CM

## 2024-12-04 DIAGNOSIS — I10 ESSENTIAL HYPERTENSION: ICD-10-CM

## 2024-12-04 DIAGNOSIS — D50.9 IRON DEFICIENCY ANEMIA, UNSPECIFIED IRON DEFICIENCY ANEMIA TYPE: ICD-10-CM

## 2024-12-04 DIAGNOSIS — Z00.00 PHYSICAL EXAM: Primary | ICD-10-CM

## 2024-12-04 PROCEDURE — 82728 ASSAY OF FERRITIN: CPT | Performed by: INTERNAL MEDICINE

## 2024-12-04 PROCEDURE — 84466 ASSAY OF TRANSFERRIN: CPT | Performed by: INTERNAL MEDICINE

## 2024-12-04 PROCEDURE — 80061 LIPID PANEL: CPT | Performed by: INTERNAL MEDICINE

## 2024-12-04 PROCEDURE — 83540 ASSAY OF IRON: CPT | Performed by: INTERNAL MEDICINE

## 2024-12-04 PROCEDURE — 80050 GENERAL HEALTH PANEL: CPT | Performed by: INTERNAL MEDICINE

## 2024-12-04 NOTE — PROGRESS NOTES
Chief Complaint   Patient presents with    Annual Exam       History of Present Illness      The patient presents for an established patient physical examination and health maintenance visit.    The patient presents for a follow-up related to hyperlipidemia. She is not following a low fat diet. She reports that she is not exercising. She is not taking medication for hyperlipidemia. She denies chest pain, shortness of breath, orthopnea, paroxysmal nocturnal dyspnea, dyspnea on exertion, edema, palpitations or syncope.    The patient presents for a follow-up related to hypertension. The patient reports that she has had no headaches or blurred vision. She states that she is taking her medication as prescribed. She is not having medication side effects.    The patient presents for a follow-up related to iron deficiency anemia. There are no reports of blood loss. The patient has no symptoms of a dry cough, a wet cough, wheezing, fever, nausea, vomiting, diarrhea, myalgias, abdominal pain, sweats, weight loss, decreased appetite, chills, fatigue or paresthesias. The patient's energy level is normal.    The patient presents with pain in the left foot of many months duration. There is no history of trauma. The patient has swelling associated with the pain. There is no stiffness. The patient denies a history of overuse or repetitive motion with the affected joints. She has been seeing podiatry and the pain persists.      The joint pain is aggravated by motion. The pain has no alleviating factors noted.      Medications      Current Outpatient Medications:     cyclobenzaprine (FLEXERIL) 5 MG tablet, TAKE ONE TO TWO TABLETS BY MOUTH NIGHTLY AS NEEDED, Disp: 60 tablet, Rfl: 0    losartan-hydrochlorothiazide (HYZAAR) 50-12.5 MG per tablet, Take 1 tablet by mouth Daily., Disp: 90 tablet, Rfl: 0    multivitamin with minerals (MULTIVITAMIN ADULT PO), Take 1 tablet by mouth Daily., Disp: , Rfl:     pregabalin (LYRICA) 75 MG capsule,  "Take 1 capsule by mouth 2 (Two) Times a Day., Disp: 60 capsule, Rfl: 2    propranolol (INDERAL) 10 MG tablet, Take 1 tablet by mouth 2 (Two) Times a Day., Disp: 60 tablet, Rfl: 2     Allergies    No Known Allergies    Problem List    Patient Active Problem List   Diagnosis    Iron deficiency anemia    Adverse effect of iron or its compound    Vitamin D deficiency    Health care maintenance    Low back pain    Herniation of intervertebral disc of lumbar spine    Degenerative lumbar disc    Essential hypertension    Obesity       Medications, Allergies, Problems List and Past History were reviewed and updated.    Physical Examination    /88 (BP Location: Left arm, Patient Position: Sitting, Cuff Size: Adult)   Pulse 72   Temp 98.9 °F (37.2 °C) (Infrared)   Resp 18   Ht 163.8 cm (64.5\")   Wt 126 kg (278 lb)   LMP 11/15/2024 (Approximate)   BMI 46.98 kg/m²       HEENT: Head- Normocephalic Atraumatic. Facies- Within normal limits. Pinnas- Normal texture and shape bilaterally. Canals- Normal bilaterally. TMs- Normal bilaterally. Nares- Patent bilaterally. Nasal Septum- is normal. There is no tenderness to palpation over the frontal or maxillary sinuses. Lids- Normal bilaterally. Conjunctiva- Clear bilaterally. Sclera- Anicteric bilaterally. Oropharynx- Moist with no lesions. Tonsils- No enlargement, erythema or exudate.    Neck: Thyroid- non enlarged, symmetric and has no nodules. No bruits are detected. ROM- Normal Range of Motion with no rigidity.    Lungs: Auscultation- Clear to auscultation bilaterally. There are no retractions, clubbing or cyanosis. The Expiratory to Inspiratory ratio is equal.    Lymph Nodes: Cervical- no enlarged lymph nodes noted. Clavicular- no enlarged supraclavicular lymph nodes noted. Axillary- no enlarged axillary lymph nodes noted. Inguinal- no enlarged inguinal lymph nodes noted.    Cardiovascular: There are no carotid bruits. Heart- Normal Rate with Regular rhythm and no " murmurs. There are no gallops. There are no rubs. In the lower extremities there is no edema. The upper extremities do not have edema.    Abdomen: Soft, benign, non-tender with no masses, hernias, organomegaly or scars.    Breast: Normal contours bilaterally with no masses, discharge, skin changes or lumps. There are no scars noted.    Feet: The feet are symmetric with normal sudheer landmarks. There is no tenderness to palpation bilaterally. The feet have normal posterior tibial and dorsalis pedis pulses and normal capillary refill bilaterally. The monofilament examination is normal bilaterally.       The arches are normal bilaterally. There are no skin or nail lesions present. There are no ingrown nails. There are no bunions noted.    Dermatologic: The patient has no worrisome or suspicious skin lesions noted.    Impression and Assessment    Normal Physical Examination.    Encounter for Screening Mammogram for Malignant Neoplasm of the Breast.    Hyperlipidemia.    Essential Hypertension.    Iron Deficiency Anemia.    Left Foot Pain.    Plan    Hyperlipidemia Plan: The patient was instructed to exercise daily, eat a low fat diet and continue her medications.    Essential Hypertension Plan: The patient was instructed to continue the current medications.    Left Foot Pain Plan: She was referred to orthopedics.    Iron Deficiency Anemia Plan: Further plans will be made after results of tests are available.    Counseling was provided regarding: Adequate Aerobic Exercise, Dental Visits, Flossing Teeth, Heart Healthy Diet, Seat Belt Utilization and Weight Lose.    The following was ordered for screening and health maintenance: Screening Mammogram.       Immunizations Ordered and Administered: None.    Diagnoses and all orders for this visit:    1. Physical exam (Primary)    2. Essential hypertension  -     CBC & Differential; Future  -     Comprehensive Metabolic Panel; Future  -     TSH; Future  -     POC Urinalysis  Dipstick, Automated; Future    3. Hyperlipidemia, unspecified hyperlipidemia type  -     Comprehensive Metabolic Panel; Future  -     Lipid Panel; Future    4. Iron deficiency anemia, unspecified iron deficiency anemia type  -     CBC & Differential; Future  -     Ferritin; Future  -     Iron Profile; Future    5. Left foot pain  -     Ambulatory Referral to Orthopedic Surgery    6. High risk medication use  -     ToxAssure Flex 23, Ur -; Future  -     ToxAssure Flex 23, Ur - Urine, Clean Catch    7. Encounter for screening mammogram for breast cancer  -     Mammo Screening Digital Tomosynthesis Bilateral With CAD; Future      Class 3 Severe Obesity (BMI >=40). Obesity-related health conditions include the following: hypertension and dyslipidemias. Obesity is unchanged. BMI is is above average; BMI management plan is completed. We discussed low calorie, low carb based diet program, portion control, increasing exercise, and Information on healthy weight added to patient's after visit summary.        Return to Office    The patient was instructed to return for follow-up in 1 year. The patient was instructed to return sooner if the condition changes, worsens, or does not resolve.

## 2024-12-05 LAB
ALBUMIN SERPL-MCNC: 3.8 G/DL (ref 3.5–5.2)
ALBUMIN/GLOB SERPL: 1 G/DL
ALP SERPL-CCNC: 72 U/L (ref 39–117)
ALT SERPL W P-5'-P-CCNC: 16 U/L (ref 1–33)
ANION GAP SERPL CALCULATED.3IONS-SCNC: 14.4 MMOL/L (ref 5–15)
AST SERPL-CCNC: 22 U/L (ref 1–32)
BASOPHILS # BLD AUTO: 0.04 10*3/MM3 (ref 0–0.2)
BASOPHILS NFR BLD AUTO: 0.6 % (ref 0–1.5)
BILIRUB SERPL-MCNC: 0.3 MG/DL (ref 0–1.2)
BUN SERPL-MCNC: 11 MG/DL (ref 6–20)
BUN/CREAT SERPL: 16.7 (ref 7–25)
CALCIUM SPEC-SCNC: 9.2 MG/DL (ref 8.6–10.5)
CHLORIDE SERPL-SCNC: 102 MMOL/L (ref 98–107)
CHOLEST SERPL-MCNC: 228 MG/DL (ref 0–200)
CO2 SERPL-SCNC: 22.6 MMOL/L (ref 22–29)
CREAT SERPL-MCNC: 0.66 MG/DL (ref 0.57–1)
DEPRECATED RDW RBC AUTO: 40.8 FL (ref 37–54)
EGFRCR SERPLBLD CKD-EPI 2021: 109.7 ML/MIN/1.73
EOSINOPHIL # BLD AUTO: 0.12 10*3/MM3 (ref 0–0.4)
EOSINOPHIL NFR BLD AUTO: 1.7 % (ref 0.3–6.2)
ERYTHROCYTE [DISTWIDTH] IN BLOOD BY AUTOMATED COUNT: 13.1 % (ref 12.3–15.4)
FERRITIN SERPL-MCNC: 11.9 NG/ML (ref 13–150)
GLOBULIN UR ELPH-MCNC: 3.9 GM/DL
GLUCOSE SERPL-MCNC: 75 MG/DL (ref 65–99)
HCT VFR BLD AUTO: 36.3 % (ref 34–46.6)
HDLC SERPL-MCNC: 46 MG/DL (ref 40–60)
HGB BLD-MCNC: 11.9 G/DL (ref 12–15.9)
IMM GRANULOCYTES # BLD AUTO: 0.02 10*3/MM3 (ref 0–0.05)
IMM GRANULOCYTES NFR BLD AUTO: 0.3 % (ref 0–0.5)
IRON 24H UR-MRATE: 66 MCG/DL (ref 37–145)
IRON SATN MFR SERPL: 13 % (ref 20–50)
LDLC SERPL CALC-MCNC: 157 MG/DL (ref 0–100)
LDLC/HDLC SERPL: 3.36 {RATIO}
LYMPHOCYTES # BLD AUTO: 1.69 10*3/MM3 (ref 0.7–3.1)
LYMPHOCYTES NFR BLD AUTO: 23.7 % (ref 19.6–45.3)
MCH RBC QN AUTO: 28.6 PG (ref 26.6–33)
MCHC RBC AUTO-ENTMCNC: 32.8 G/DL (ref 31.5–35.7)
MCV RBC AUTO: 87.3 FL (ref 79–97)
MONOCYTES # BLD AUTO: 0.5 10*3/MM3 (ref 0.1–0.9)
MONOCYTES NFR BLD AUTO: 7 % (ref 5–12)
NEUTROPHILS NFR BLD AUTO: 4.76 10*3/MM3 (ref 1.7–7)
NEUTROPHILS NFR BLD AUTO: 66.7 % (ref 42.7–76)
NRBC BLD AUTO-RTO: 0 /100 WBC (ref 0–0.2)
PLATELET # BLD AUTO: 334 10*3/MM3 (ref 140–450)
PMV BLD AUTO: 10.2 FL (ref 6–12)
POTASSIUM SERPL-SCNC: 3.7 MMOL/L (ref 3.5–5.2)
PROT SERPL-MCNC: 7.7 G/DL (ref 6–8.5)
RBC # BLD AUTO: 4.16 10*6/MM3 (ref 3.77–5.28)
SODIUM SERPL-SCNC: 139 MMOL/L (ref 136–145)
TIBC SERPL-MCNC: 513 MCG/DL (ref 298–536)
TRANSFERRIN SERPL-MCNC: 344 MG/DL (ref 200–360)
TRIGL SERPL-MCNC: 137 MG/DL (ref 0–150)
TSH SERPL DL<=0.05 MIU/L-ACNC: 1.22 UIU/ML (ref 0.27–4.2)
VLDLC SERPL-MCNC: 25 MG/DL (ref 5–40)
WBC NRBC COR # BLD AUTO: 7.13 10*3/MM3 (ref 3.4–10.8)

## 2024-12-08 LAB
1OH-MIDAZOLAM UR QL SCN: NOT DETECTED NG/MG CREAT
6MAM UR QL SCN: NEGATIVE NG/ML
7AMINOCLONAZEPAM/CREAT UR: NOT DETECTED NG/MG CREAT
A-OH ALPRAZ/CREAT UR: NOT DETECTED NG/MG CREAT
A-OH-TRIAZOLAM/CREAT UR CFM: NOT DETECTED NG/MG CREAT
ACP UR QL CFM: NOT DETECTED
ALPRAZ/CREAT UR CFM: NOT DETECTED NG/MG CREAT
AMPHETAMINES UR QL SCN: NEGATIVE NG/ML
APAP UR QL SCN: NEGATIVE UG/ML
BARBITURATES UR QL SCN: NEGATIVE NG/ML
BENZODIAZ SCN METH UR: NEGATIVE
BUPRENORPHINE UR QL SCN: NEGATIVE
BUPRENORPHINE/CREAT UR: NOT DETECTED NG/MG CREAT
CANNABINOIDS UR QL SCN: NEGATIVE NG/ML
CARISOPRODOL UR QL: NEGATIVE NG/ML
CLONAZEPAM/CREAT UR CFM: NOT DETECTED NG/MG CREAT
COCAINE+BZE UR QL SCN: NEGATIVE NG/ML
CREAT UR-MCNC: 23 MG/DL
D-METHORPHAN UR-MCNC: NOT DETECTED NG/ML
D-METHORPHAN+LEVORPHANOL UR QL: NOT DETECTED
DESALKYLFLURAZ/CREAT UR: NOT DETECTED NG/MG CREAT
DIAZEPAM/CREAT UR: NOT DETECTED NG/MG CREAT
ETHANOL UR QL SCN: NEGATIVE G/DL
ETHANOL UR QL SCN: NEGATIVE NG/ML
FENTANYL CTO UR SCN-MCNC: NEGATIVE NG/ML
FENTANYL/CREAT UR: NOT DETECTED NG/MG CREAT
FLUNITRAZEPAM UR QL SCN: NOT DETECTED NG/MG CREAT
GABAPENTIN UR-MCNC: NEGATIVE UG/ML
HALLUCINOGENS UR: NEGATIVE
HYPNOTICS UR QL SCN: NEGATIVE
KETAMINE UR QL: NOT DETECTED
LORAZEPAM/CREAT UR: NOT DETECTED NG/MG CREAT
MEPERIDINE UR QL SCN: NEGATIVE NG/ML
METHADONE UR QL SCN: NEGATIVE NG/ML
METHADONE+METAB UR QL SCN: NEGATIVE NG/ML
MIDAZOLAM/CREAT UR CFM: NOT DETECTED NG/MG CREAT
MISCELLANEOUS, UR: NEGATIVE
NORBUPRENORPHINE/CREAT UR: NOT DETECTED NG/MG CREAT
NORDIAZEPAM/CREAT UR: NOT DETECTED NG/MG CREAT
NORFENTANYL/CREAT UR: NOT DETECTED NG/MG CREAT
NORFLUNITRAZEPAM UR-MCNC: NOT DETECTED NG/MG CREAT
NORKETAMINE UR-MCNC: NOT DETECTED UG/ML
OPIATES UR SCN-MCNC: NEGATIVE NG/ML
OXAZEPAM/CREAT UR: NOT DETECTED NG/MG CREAT
OXYCODONE CTO UR SCN-MCNC: NEGATIVE NG/ML
PCP UR QL SCN: NEGATIVE NG/ML
PRESCRIBED MEDICATIONS: NORMAL
PROPOXYPH UR QL SCN: NEGATIVE NG/ML
TAPENTADOL CTO UR SCN-MCNC: NEGATIVE NG/ML
TEMAZEPAM/CREAT UR: NOT DETECTED NG/MG CREAT
TRAMADOL UR QL SCN: NEGATIVE NG/ML
ZALEPLON UR-MCNC: NOT DETECTED NG/ML
ZOLPIDEM PHENYL-4-CARB UR QL SCN: NOT DETECTED
ZOLPIDEM UR QL SCN: NOT DETECTED
ZOPICLONE-N-OXIDE UR-MCNC: NOT DETECTED NG/ML

## 2024-12-10 ENCOUNTER — OFFICE VISIT (OUTPATIENT)
Age: 44
End: 2024-12-10
Payer: COMMERCIAL

## 2024-12-10 VITALS
WEIGHT: 275 LBS | SYSTOLIC BLOOD PRESSURE: 132 MMHG | DIASTOLIC BLOOD PRESSURE: 86 MMHG | BODY MASS INDEX: 45.82 KG/M2 | HEIGHT: 65 IN

## 2024-12-10 DIAGNOSIS — M72.2 PLANTAR FASCIITIS, BILATERAL: ICD-10-CM

## 2024-12-10 DIAGNOSIS — E66.01 CLASS 3 SEVERE OBESITY DUE TO EXCESS CALORIES WITHOUT SERIOUS COMORBIDITY WITH BODY MASS INDEX (BMI) OF 45.0 TO 49.9 IN ADULT: ICD-10-CM

## 2024-12-10 DIAGNOSIS — M79.672 LEFT FOOT PAIN: Primary | ICD-10-CM

## 2024-12-10 DIAGNOSIS — M77.52 BURSITIS OF LEFT FOOT: ICD-10-CM

## 2024-12-10 DIAGNOSIS — E66.813 CLASS 3 SEVERE OBESITY DUE TO EXCESS CALORIES WITHOUT SERIOUS COMORBIDITY WITH BODY MASS INDEX (BMI) OF 45.0 TO 49.9 IN ADULT: ICD-10-CM

## 2024-12-10 NOTE — PROGRESS NOTES
[facility] Office Visit - Anne Marie Sanchez PA-C    Office Visit       Patient Name: Anne Marie May    Chief Complaint:   Chief Complaint   Patient presents with    Left Foot - Pain, Initial Evaluation       Referring Physician: Nic Greer MD    History of Present Illness:   Anne Marie May is a 44 y.o. female who presents presents today to discuss her bilateral heel pain and left lateral foot pain.  She denies any trauma or injury.  She reports the lateral foot pain has been bothering her intermittently for quite some time.  She reports that to be 6/10 based on shoewear and activity.  She has seen podiatry who diagnosed her with a hairline fracture and placed her in a short boot which she wore for several weeks.  She has also undergone laser therapy as well as has custom inserts for her shoes.  She does feel there are certain shoes more that are wider that are more comfortable.  Patient also complains of bilateral heel pain left more painful than right.  She has treated with stretching.  She reports pain is intermittent and sometimes worse first thing in the morning.      Subjective     Review of Systems   HENT: Negative.     Eyes: Negative.    Respiratory: Negative.     Cardiovascular: Negative.    Gastrointestinal: Negative.    Endocrine: Negative.    Genitourinary: Negative.    Musculoskeletal:  Positive for arthralgias.   Allergic/Immunologic: Negative.    Hematological: Negative.    Psychiatric/Behavioral: Negative.          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    Neck strain     Neuromuscular disorder     Neuropathy/ foot drop left    Obesity     Uterine fibroid     Vitamin D deficiency        Past Surgical History:   Past Surgical History:   Procedure Laterality Date    APPENDECTOMY      BACK SURGERY  2019    L4L5 Discectomy    COLONOSCOPY      LUMBAR DISCECTOMY Left 2019    Procedure: LUMBAR DISCECTOMY LAMINOTOMY L4-5 LEFT;  Surgeon: Jeff Bright MD;  Location: [facility];  Service: Neurosurgery    MOUTH SURGERY      wisdom teeth    MYOMECTOMY VAGINAL APPROACH N/A 2016    SPINE SURGERY  2019    L4-5 Discectomy    WISDOM TOOTH EXTRACTION         Family History:   Family History   Problem Relation Age of Onset    Colon cancer Mother     Cancer Mother         Passed from metastatic colon cancer    Heart disease Father     Stroke Father     Hypertension Father             Anxiety disorder Father     Depression Father     Anxiety disorder Brother     Breast cancer Neg Hx     Ovarian cancer Neg Hx        Social History:   Social History     Socioeconomic History    Marital status:    Tobacco Use    Smoking status: Former     Current packs/day: 0.00     Types: Cigarettes     Start date: 1997     Quit date: 10/15/2012     Years since quittin.1     Passive exposure: Past    Smokeless tobacco: Never   Vaping Use    Vaping status: Never Used   Substance and Sexual Activity    Alcohol use: Yes     Alcohol/week: 3.0 standard drinks of alcohol     Types: 3 Glasses of wine per week    Drug use: No    Sexual activity: Yes     Partners: Male     Birth control/protection: None       Medications:   Current Outpatient Medications:     cyclobenzaprine (FLEXERIL) 5 MG tablet, TAKE ONE TO TWO TABLETS BY MOUTH NIGHTLY AS NEEDED, Disp: 60 tablet, Rfl: 0    losartan-hydrochlorothiazide (HYZAAR) 50-12.5 MG per tablet, Take 1 tablet by mouth Daily., Disp: 90 tablet, Rfl: 0     "multivitamin with minerals (MULTIVITAMIN ADULT PO), Take 1 tablet by mouth Daily., Disp: , Rfl:     pregabalin (LYRICA) 75 MG capsule, Take 1 capsule by mouth 2 (Two) Times a Day., Disp: 60 capsule, Rfl: 2    propranolol (INDERAL) 10 MG tablet, Take 1 tablet by mouth 2 (Two) Times a Day., Disp: 60 tablet, Rfl: 2    Allergies: No Known Allergies    I have reviewed and updated the following portions of the patient's history and review of systems: allergies, current medications, past family history, past medical history, past social history, past surgical history and problem list.    Objective      Vital Signs:   Vitals:    12/10/24 0801   BP: 132/86   Weight: 125 kg (275 lb)   Height: 163.8 cm (64.5\")       Ortho Exam:  V:  Dorsalis Pedis:  Right: 2+; Left:2+    Posterior Tibial: Right:2+; Left:2+    Capillary Refill:  Brisk  MSK:      Tibia:  Right:  non tender; Left:  non tender      Ankle:  Right: non tender, ROM  normal, and motor function  normal; Left:  non tender, ROM  normal, and motor function  normal      Foot:  Right:  tender at the origin of the plantar fascia ; Left:  tender at the origin of the plantar fascia and base of the 5th MT with no swelling, warmth or erythema.      NEURO: [place] 5.07 monofilament test: not evaluated    Lower extremity sensation: intact     Calf Atrophy:none    Motor Function: all 5/5        Results Review:   XR Foot 3+ View Left  Left Foot X-Ray    Indication: Pain    Views:  AP, Lateral, and Oblique Weightbearing    Comparison:  None    Findings:  No fractures or dislocation. No bony lesions. Normal soft tissues.  Mild   degenerative changes noted at the first MTP joint.  Osteophytes noted   along the anterior aspect of the tibiotalar articulation without   significant joint space narrowing.            Assessment / Plan      Assessment:  Diagnoses and all orders for this visit:    1. Left foot pain (Primary)  -     Cancel: XR Foot 3+ View Left  -     XR Foot 3+ View " Left    2. Plantar fasciitis, bilateral    3. Bursitis of left foot        Quality Metrics:   BMI:   Class 3 Severe Obesity (BMI >=40). Obesity-related health conditions include the following: hypertension. Obesity is unchanged. BMI is is above average; BMI management plan is completed. We discussed portion control and increasing exercise.       Tobacco:   Anne Marie May  reports that she quit smoking about 12 years ago. Her smoking use included cigarettes. She started smoking about 27 years ago. She has been exposed to tobacco smoke. She has never used smokeless tobacco.     Plan:  Left foot bursitis.  I reviewed today's x-rays clinical findings with the patient.  She is tender over the base of the fifth metatarsal with no swelling or warmth.  This is been going on for quite some time.  I do not see any evidence of any fracture on x-ray.  We discussed further treatment including topical anti-inflammatories, wide shoes, continued orthotics from [facility] which are cork.  In the future, we may consider injection if or further imaging with MRI if pain persists.  She does wear compression stockings while working.  She will follow-up as needed.  2.  Bilateral plantar fasciitis, left more painful than right.  I explained plantar fasciitis in detail to the patient.  I explained to the bow-string mechanism of the plantar fascia.  I went over the current research of the [employer] with them.  I explained the treatments that were not statistically significant in improving the problem including: injection of steroids, special orthotics, special shoes, surgery, medication, ice, time off work, etc.    I explained the treatments that are statistically significant at improving the problem.  These include stretching/night splinting, casting.    I explained the most important treatment: Stretching and night splinting.   I showed them the stretches and they were able to reproduce  "them.  I emphasized the \"toe pull\" as the most important stretch.  They need to do the stretches 5 repetitions each, 6-8 times per day.  I explained how to do them at work, at home, before getting out of bed, before getting out of the car, and before getting up from a chair.    We provided them with a night splint.    If they do not improve after 4 months of aggressive stretching and night splinting, the next step will be a short leg fiberglass walking cast worn for 6 weeks.    Preprinted education was provided to the patient.    Patient will begin stretching and night splinting and return for casting if needed.  3.  Patient has a BMI of 46.47  The patient has been instructed on weight loss avenues including diet, portion control, calorie restriction, low/no impact exercise, referral to weight loss management and/or bariatric surgery.  It was explained that weight loss can improve joint pain alone by decreasing the joint reaction forces.  For every pound of weight change, the knee and hip joints see a 4 to 5 fold change in pressure.          Anne Marie Sanchez PA-C  [facility]       Dictated using Dragon Speech Recognition.  "

## 2024-12-23 DIAGNOSIS — I10 ESSENTIAL HYPERTENSION: ICD-10-CM

## 2024-12-24 RX ORDER — LOSARTAN POTASSIUM AND HYDROCHLOROTHIAZIDE 12.5; 5 MG/1; MG/1
1 TABLET ORAL DAILY
Qty: 90 TABLET | Refills: 0 | Status: SHIPPED | OUTPATIENT
Start: 2024-12-24

## 2024-12-27 ENCOUNTER — OFFICE VISIT (OUTPATIENT)
Age: 44
End: 2024-12-27
Payer: COMMERCIAL

## 2024-12-27 VITALS
WEIGHT: 275.57 LBS | HEIGHT: 64 IN | SYSTOLIC BLOOD PRESSURE: 155 MMHG | BODY MASS INDEX: 47.05 KG/M2 | DIASTOLIC BLOOD PRESSURE: 110 MMHG

## 2024-12-27 DIAGNOSIS — E66.813 CLASS 3 SEVERE OBESITY WITHOUT SERIOUS COMORBIDITY WITH BODY MASS INDEX (BMI) OF 45.0 TO 49.9 IN ADULT, UNSPECIFIED OBESITY TYPE: ICD-10-CM

## 2024-12-27 DIAGNOSIS — E66.01 CLASS 3 SEVERE OBESITY WITHOUT SERIOUS COMORBIDITY WITH BODY MASS INDEX (BMI) OF 45.0 TO 49.9 IN ADULT, UNSPECIFIED OBESITY TYPE: ICD-10-CM

## 2024-12-27 DIAGNOSIS — M25.561 RIGHT KNEE PAIN, UNSPECIFIED CHRONICITY: Primary | ICD-10-CM

## 2024-12-27 DIAGNOSIS — M17.11 PRIMARY OSTEOARTHRITIS OF RIGHT KNEE: ICD-10-CM

## 2024-12-27 DIAGNOSIS — R03.0 ELEVATED BLOOD PRESSURE READING: ICD-10-CM

## 2024-12-27 PROCEDURE — 99214 OFFICE O/P EST MOD 30 MIN: CPT | Performed by: PHYSICIAN ASSISTANT

## 2024-12-27 NOTE — PROGRESS NOTES
[facility] Note    Subjective     Chief Complaint   Patient presents with    Right Knee - Pain        HPI  Anne Marie May is a 44 y.o. female.  Established patient presents for evaluation of right knee pain.  Symptoms/pain have been ongoing for 6 to 7 years.  CECE: No recent history of injury or trauma.  History of high school and college softball.    Pain scale: 1-2/10.  Severity of the pain mild to moderate.  Quality of the pain dull, aching.  Associated symptoms pain, popping, grinding, giving away.  Activity related to pain walking, standing, stairs, rising from a seated position.  Pain eased by resting, ice.  No reported numbness or tingling.  Prior treatments anti-inflammatories.    No reported mechanical symptoms, such as locking or catching.    Denies fever, chills, night sweats or other constitutional symptoms.    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    Neck strain     Neuromuscular disorder 2/25    Neuropathy/ foot drop left    Obesity     Uterine fibroid     Vitamin D deficiency       Past Surgical History:   Procedure Laterality Date    APPENDECTOMY      BACK SURGERY  11/6/2019    L4L5 Discectomy    COLONOSCOPY      LUMBAR DISCECTOMY Left 11/06/2019    Procedure: LUMBAR DISCECTOMY LAMINOTOMY L4-5 LEFT;  Surgeon: Jeff Bright MD;  Location: [facility];  Service: Neurosurgery    MOUTH SURGERY      wisdom teeth    MYOMECTOMY VAGINAL APPROACH N/A 06/14/2016    SPINE SURGERY  11/06/2019    L4-5 Discectomy    WISDOM TOOTH EXTRACTION        Family History   Problem Relation Age of  Onset    Colon cancer Mother     Cancer Mother         Passed from metastatic colon cancer    Heart disease Father     Stroke Father     Hypertension Father             Anxiety disorder Father     Depression Father     Anxiety disorder Brother     Breast cancer Neg Hx     Ovarian cancer Neg Hx      Social History     Socioeconomic History    Marital status:    Tobacco Use    Smoking status: Former     Current packs/day: 0.00     Types: Cigarettes     Start date: 1997     Quit date: 10/15/2012     Years since quittin.2     Passive exposure: Past    Smokeless tobacco: Never   Vaping Use    Vaping status: Never Used   Substance and Sexual Activity    Alcohol use: Yes     Alcohol/week: 3.0 standard drinks of alcohol     Types: 3 Glasses of wine per week    Drug use: No    Sexual activity: Yes     Partners: Male     Birth control/protection: None      Current Outpatient Medications on File Prior to Visit   Medication Sig Dispense Refill    cyclobenzaprine (FLEXERIL) 5 MG tablet TAKE ONE TO TWO TABLETS BY MOUTH NIGHTLY AS NEEDED 60 tablet 0    losartan-hydrochlorothiazide (HYZAAR) 50-12.5 MG per tablet Take 1 tablet by mouth Daily. 90 tablet 0    multivitamin with minerals (MULTIVITAMIN ADULT PO) Take 1 tablet by mouth Daily.      pregabalin (LYRICA) 75 MG capsule Take 1 capsule by mouth 2 (Two) Times a Day. 60 capsule 2    propranolol (INDERAL) 10 MG tablet Take 1 tablet by mouth 2 (Two) Times a Day. 60 tablet 2     No current facility-administered medications on file prior to visit.      No Known Allergies     The following portions of the patient's history were reviewed and updated as appropriate: allergies, current medications, past family history, past medical history, past social history, past surgical history, and problem list.    Review of Systems   Constitutional: Negative.    HENT: Negative.     Eyes: Negative.    Respiratory: Negative.     Cardiovascular: Negative.    Gastrointestinal:  "Negative.    Endocrine: Negative.    Genitourinary: Negative.    Musculoskeletal:  Positive for arthralgias.   Skin: Negative.    Allergic/Immunologic: Negative.    Neurological: Negative.    Hematological: Negative.    Psychiatric/Behavioral: Negative.          Objective      Physical Exam  Ht 163.8 cm (64.49\")   Wt 125 kg (275 lb 9.2 oz)   LMP 11/15/2024 (Approximate)   BMI 46.59 kg/m²     Body mass index is 46.59 kg/m².    GENERAL APPEARANCE: awake, alert & oriented x 3, in no acute distress and well developed, well nourished  PSYCH: normal mood and affect  LUNGS:  breathing nonlabored, no wheezing  EYES: sclera anicteric, pupils equal  CARDIOVASCULAR: palpable pulses. Capillary refill less than 2 seconds  INTEGUMENTARY: skin intact, no clubbing, cyanosis  NEUROLOGIC:  Normal Sensation         Ortho Exam  Right knee  Alignment: Genu varum  Skin: Intact without any erythema, warmth, swelling or evidence of infection.  Motion: 0-120° with crepitus.  Tenderness: Positive medial joint line.    Instability: Lachman negative.  Varus and valgus stress test negative.   Meniscus: Watson's mild discomfort medial joint line.  Patella: Compression/grind positive.  Straight leg raise: Intact.  Motor: Grossly intact Q/HS/TA/GS/EHL/P  Sensory: Grossly intact DP/SP/S/S/T nerve distributions.       Imaging/Studies  Ordered right knee plain films.  Imaging read/interpreted by Dr. Mcmullen.    Imaging Results (Last 7 Days)       Procedure Component Value Units Date/Time    XR Knee 4+ View Right [[id number]] Resulted: 12/27/24 0856     Updated: 12/27/24 0856    Narrative:      Standing 4 views of the right knee with left knee for comparison including   AP lateral sunrise and skiers, show bilateral significant tricompartmental   knee arthritis, right a little more prominent than left, no comparison              Assessment/Plan        ICD-10-CM ICD-9-CM   1. Right knee pain, unspecified chronicity  M25.561 719.46   2. Primary " osteoarthritis of right knee  M17.11 715.16   3. Elevated blood pressure reading  R03.0 796.2   4. Class 3 severe obesity without serious comorbidity with body mass index (BMI) of 45.0 to 49.9 in adult, unspecified obesity type  E66.813 278.01    E66.01 V85.42    Z68.42        Orders Placed This Encounter   Procedures    XR Knee 4+ View Right        -Right knee pain due to osteoarthritis.  -Reviewed imaging with the patient.  -Treatment options were discussed with the patient to include observation, NSAIDs, bracing, injections, surgical intervention.  -At this time she would like to continue with ibuprofen as needed and activity modification, as her pain is intermittent.   -Continue treatment with ibuprofen as needed.   -If her pain worsens she will return to the clinic for possible injection, bracing.  -With regard to bracing we discussed use of medial off . Declined for now.  -Obesity--patient has a BMI of 46.59.  The patient has been instructed on various weight loss avenues including diet, portion control, calorie restriction, low/no impact exercise, referral to weight loss management and/or bariatric surgery.  It was explained that weight loss can improve joint pain alone by decreasing the joint reaction forces.  For every pound of weight change, the knee and hip joints see a 4 to 5 fold change in pressure.  Given these options, the patient will proceed with diet and low impact exercise.  -Elevated blood pressure--patient with a history of hypertension.  At this time she denies dizziness, blurred vision, headache, chest pain, shortness of breath.  She does report occasional whitecoat syndrome.  Patient was instructed to contact her PCP regarding her elevated blood pressure.  If patient has any of the above symptoms recommend she proceed to the ED.  -Follow up as needed.  -Questions and concerns answered.      Medical Decision Making  Management Options : over-the-counter medicine  Data/Risk: radiology  tests      Mornea Solis PA-C  12/27/24  09:10 EST               EMR Dragon/Transcription disclaimer:  Much of this encounter note is an electronic transcription of spoken language to printed text. Electronic transcription of spoken language may permit erroneous, or at times, nonsensical words or phrases to be inadvertently transcribed. Although I have reviewed the note for such errors, some may still exist.

## 2025-01-26 DIAGNOSIS — I10 ESSENTIAL HYPERTENSION: ICD-10-CM

## 2025-01-26 DIAGNOSIS — F41.9 ANXIETY: ICD-10-CM

## 2025-01-27 RX ORDER — LOSARTAN POTASSIUM AND HYDROCHLOROTHIAZIDE 12.5; 5 MG/1; MG/1
1 TABLET ORAL DAILY
Qty: 90 TABLET | Refills: 0 | Status: SHIPPED | OUTPATIENT
Start: 2025-01-27

## 2025-01-27 RX ORDER — PROPRANOLOL HYDROCHLORIDE 10 MG/1
10 TABLET ORAL 2 TIMES DAILY
Qty: 60 TABLET | Refills: 2 | Status: SHIPPED | OUTPATIENT
Start: 2025-01-27

## 2025-02-23 DIAGNOSIS — I10 ESSENTIAL HYPERTENSION: ICD-10-CM

## 2025-02-24 RX ORDER — LOSARTAN POTASSIUM AND HYDROCHLOROTHIAZIDE 12.5; 5 MG/1; MG/1
1 TABLET ORAL DAILY
Qty: 90 TABLET | Refills: 0 | Status: SHIPPED | OUTPATIENT
Start: 2025-02-24

## 2025-03-24 DIAGNOSIS — M54.50 LOW BACK PAIN, UNSPECIFIED BACK PAIN LATERALITY, UNSPECIFIED CHRONICITY, UNSPECIFIED WHETHER SCIATICA PRESENT: ICD-10-CM

## 2025-03-24 DIAGNOSIS — F41.9 ANXIETY: ICD-10-CM

## 2025-03-25 RX ORDER — PROPRANOLOL HYDROCHLORIDE 10 MG/1
10 TABLET ORAL 2 TIMES DAILY
Qty: 60 TABLET | Refills: 2 | Status: SHIPPED | OUTPATIENT
Start: 2025-03-25

## 2025-03-26 RX ORDER — CYCLOBENZAPRINE HCL 5 MG
TABLET ORAL
Qty: 60 TABLET | Refills: 0 | Status: SHIPPED | OUTPATIENT
Start: 2025-03-26

## 2025-06-05 ENCOUNTER — OFFICE VISIT (OUTPATIENT)
Dept: INTERNAL MEDICINE | Facility: CLINIC | Age: 45
End: 2025-06-05
Payer: COMMERCIAL

## 2025-06-05 VITALS
SYSTOLIC BLOOD PRESSURE: 138 MMHG | WEIGHT: 288 LBS | BODY MASS INDEX: 48.69 KG/M2 | HEART RATE: 72 BPM | RESPIRATION RATE: 16 BRPM | TEMPERATURE: 98.4 F | DIASTOLIC BLOOD PRESSURE: 88 MMHG

## 2025-06-05 DIAGNOSIS — E78.5 HYPERLIPIDEMIA, UNSPECIFIED HYPERLIPIDEMIA TYPE: ICD-10-CM

## 2025-06-05 DIAGNOSIS — I10 ESSENTIAL HYPERTENSION: ICD-10-CM

## 2025-06-05 DIAGNOSIS — E55.9 VITAMIN D DEFICIENCY: ICD-10-CM

## 2025-06-05 DIAGNOSIS — F41.9 ANXIETY: Primary | ICD-10-CM

## 2025-06-05 DIAGNOSIS — N62 LARGE BREASTS: ICD-10-CM

## 2025-06-05 DIAGNOSIS — D50.9 IRON DEFICIENCY ANEMIA, UNSPECIFIED IRON DEFICIENCY ANEMIA TYPE: ICD-10-CM

## 2025-06-05 LAB
25(OH)D3 SERPL-MCNC: 29.7 NG/ML (ref 30–100)
ALBUMIN SERPL-MCNC: 4.2 G/DL (ref 3.5–5.2)
ALBUMIN/GLOB SERPL: 1.2 G/DL
ALP SERPL-CCNC: 81 U/L (ref 39–117)
ALT SERPL W P-5'-P-CCNC: 14 U/L (ref 1–33)
ANION GAP SERPL CALCULATED.3IONS-SCNC: 10.9 MMOL/L (ref 5–15)
AST SERPL-CCNC: 20 U/L (ref 1–32)
BASOPHILS # BLD AUTO: 0.05 10*3/MM3 (ref 0–0.2)
BASOPHILS NFR BLD AUTO: 0.7 % (ref 0–1.5)
BILIRUB BLD-MCNC: NEGATIVE MG/DL
BILIRUB SERPL-MCNC: 0.3 MG/DL (ref 0–1.2)
BUN SERPL-MCNC: 9 MG/DL (ref 6–20)
BUN/CREAT SERPL: 13.2 (ref 7–25)
CALCIUM SPEC-SCNC: 9.2 MG/DL (ref 8.6–10.5)
CHLORIDE SERPL-SCNC: 102 MMOL/L (ref 98–107)
CHOLEST SERPL-MCNC: 236 MG/DL (ref 0–200)
CLARITY, POC: CLEAR
CO2 SERPL-SCNC: 25.1 MMOL/L (ref 22–29)
COLOR UR: YELLOW
CREAT SERPL-MCNC: 0.68 MG/DL (ref 0.57–1)
DEPRECATED RDW RBC AUTO: 43.4 FL (ref 37–54)
EGFRCR SERPLBLD CKD-EPI 2021: 108.3 ML/MIN/1.73
EOSINOPHIL # BLD AUTO: 0.16 10*3/MM3 (ref 0–0.4)
EOSINOPHIL NFR BLD AUTO: 2.4 % (ref 0.3–6.2)
ERYTHROCYTE [DISTWIDTH] IN BLOOD BY AUTOMATED COUNT: 14 % (ref 12.3–15.4)
EXPIRATION DATE: NORMAL
FERRITIN SERPL-MCNC: 9.1 NG/ML (ref 13–150)
GLOBULIN UR ELPH-MCNC: 3.4 GM/DL
GLUCOSE SERPL-MCNC: 96 MG/DL (ref 65–99)
GLUCOSE UR STRIP-MCNC: NEGATIVE MG/DL
HCT VFR BLD AUTO: 35.4 % (ref 34–46.6)
HDLC SERPL-MCNC: 46 MG/DL (ref 40–60)
HGB BLD-MCNC: 11.1 G/DL (ref 12–15.9)
IMM GRANULOCYTES # BLD AUTO: 0.01 10*3/MM3 (ref 0–0.05)
IMM GRANULOCYTES NFR BLD AUTO: 0.1 % (ref 0–0.5)
IRON 24H UR-MRATE: 42 MCG/DL (ref 37–145)
IRON SATN MFR SERPL: 8 % (ref 20–50)
KETONES UR QL: NEGATIVE
LDLC SERPL CALC-MCNC: 154 MG/DL (ref 0–100)
LDLC/HDLC SERPL: 3.28 {RATIO}
LEUKOCYTE EST, POC: NEGATIVE
LYMPHOCYTES # BLD AUTO: 1.74 10*3/MM3 (ref 0.7–3.1)
LYMPHOCYTES NFR BLD AUTO: 25.9 % (ref 19.6–45.3)
Lab: NORMAL
MCH RBC QN AUTO: 27 PG (ref 26.6–33)
MCHC RBC AUTO-ENTMCNC: 31.4 G/DL (ref 31.5–35.7)
MCV RBC AUTO: 86.1 FL (ref 79–97)
MONOCYTES # BLD AUTO: 0.52 10*3/MM3 (ref 0.1–0.9)
MONOCYTES NFR BLD AUTO: 7.7 % (ref 5–12)
NEUTROPHILS NFR BLD AUTO: 4.25 10*3/MM3 (ref 1.7–7)
NEUTROPHILS NFR BLD AUTO: 63.2 % (ref 42.7–76)
NITRITE UR-MCNC: NEGATIVE MG/ML
NRBC BLD AUTO-RTO: 0 /100 WBC (ref 0–0.2)
PH UR: 6.5 [PH] (ref 5–8)
PLATELET # BLD AUTO: 365 10*3/MM3 (ref 140–450)
PMV BLD AUTO: 10.5 FL (ref 6–12)
POTASSIUM SERPL-SCNC: 3.8 MMOL/L (ref 3.5–5.2)
PROT SERPL-MCNC: 7.6 G/DL (ref 6–8.5)
PROT UR STRIP-MCNC: NEGATIVE MG/DL
RBC # BLD AUTO: 4.11 10*6/MM3 (ref 3.77–5.28)
RBC # UR STRIP: NEGATIVE /UL
SODIUM SERPL-SCNC: 138 MMOL/L (ref 136–145)
SP GR UR: 1.01 (ref 1–1.03)
TIBC SERPL-MCNC: 532 MCG/DL (ref 298–536)
TRANSFERRIN SERPL-MCNC: 357 MG/DL (ref 200–360)
TRIGL SERPL-MCNC: 195 MG/DL (ref 0–150)
UROBILINOGEN UR QL: NORMAL
VLDLC SERPL-MCNC: 36 MG/DL (ref 5–40)
WBC NRBC COR # BLD AUTO: 6.73 10*3/MM3 (ref 3.4–10.8)

## 2025-06-05 PROCEDURE — 84466 ASSAY OF TRANSFERRIN: CPT | Performed by: INTERNAL MEDICINE

## 2025-06-05 PROCEDURE — 85025 COMPLETE CBC W/AUTO DIFF WBC: CPT | Performed by: INTERNAL MEDICINE

## 2025-06-05 PROCEDURE — 80061 LIPID PANEL: CPT | Performed by: INTERNAL MEDICINE

## 2025-06-05 PROCEDURE — 83540 ASSAY OF IRON: CPT | Performed by: INTERNAL MEDICINE

## 2025-06-05 PROCEDURE — 82306 VITAMIN D 25 HYDROXY: CPT | Performed by: INTERNAL MEDICINE

## 2025-06-05 PROCEDURE — 80053 COMPREHEN METABOLIC PANEL: CPT | Performed by: INTERNAL MEDICINE

## 2025-06-05 PROCEDURE — 82728 ASSAY OF FERRITIN: CPT | Performed by: INTERNAL MEDICINE

## 2025-06-05 NOTE — PROGRESS NOTES
Chief Complaint   Patient presents with    Follow-up     6 month follow up chronic medical problems       History of Present Illness        The patient presents for a follow-up related to hypertension. The patient reports that she has had no headaches, chest pain, dyspnea, edema, syncope, blurred vision or palpitations. She states that she is taking her medication as prescribed. She is not having medication side effects.     The patient presents for a follow-up related to hyperlipidemia. She is following a low fat diet. She reports that she is exercising. She is not taking medication for hyperlipidemia. She denies orthopnea, paroxysmal nocturnal dyspnea or dyspnea on exertion.     The patient presents for a follow-up related to iron deficiency anemia. There are no reports of blood loss. The patient has no symptoms of a dry cough, a wet cough, wheezing, fever, nausea, vomiting, diarrhea, myalgias, abdominal pain, sweats, weight loss, decreased appetite, chills, fatigue or paresthesias. The patient's energy level is normal.She states she does not take iron supplements.     The patient presents for a follow-up related to anxiety. She denies currently having anxiety symptoms. She is not having panic attacks. Her energy level is good. She sleeps well. She is on propranolol medication 2x a day. She states that her current anxiety symptoms are stable.     Follow up for Vitamin D deficiency. She does take a multivitamin as supplement. Pt denies fatigue, muscle pains, bone pain, or loss of appetite.      The patient has complaints of shoulder pain and neck stiffness. She states this is due to her breast feeling heavy and weighing her down. She wears a 42H bra size. She states at the end of the day she will have a red deep bra indentation.    Medications      Current Outpatient Medications:     cyclobenzaprine (FLEXERIL) 5 MG tablet, TAKE ONE TO TWO TABLETS BY MOUTH NIGHTLY AS NEEDED, Disp: 60 tablet, Rfl: 0     losartan-hydrochlorothiazide (HYZAAR) 50-12.5 MG per tablet, Take 1 tablet by mouth Daily., Disp: 90 tablet, Rfl: 0    multivitamin with minerals (MULTIVITAMIN ADULT PO), Take 1 tablet by mouth Daily., Disp: , Rfl:     pregabalin (LYRICA) 75 MG capsule, Take 1 capsule by mouth 2 (Two) Times a Day. (Patient taking differently: Take 1 capsule by mouth 2 (Two) Times a Day. 6/4/025 reports takiing only at night), Disp: 60 capsule, Rfl: 2    propranolol (INDERAL) 10 MG tablet, Take 1 tablet by mouth 2 (Two) Times a Day., Disp: 60 tablet, Rfl: 2     Allergies    No Known Allergies    Problem List    Patient Active Problem List   Diagnosis    Iron deficiency anemia    Adverse effect of iron or its compound    Vitamin D deficiency    Health care maintenance    Low back pain    Herniation of intervertebral disc of lumbar spine    Degenerative lumbar disc    Essential hypertension    Obesity       Medications, Allergies, Problems List and Past History were reviewed and updated.    Physical Examination    /92 (BP Location: Left arm, Patient Position: Sitting, Cuff Size: Large Adult)   Pulse 72   Temp 98.4 °F (36.9 °C) (Infrared)   Resp 16   Wt 131 kg (288 lb)   LMP 05/23/2025   BMI 48.69 kg/m²      Class 3 Severe Obesity (BMI >=40). Obesity-related health conditions include the following: hypertension and dyslipidemias. Obesity is improving with lifestyle modifications. BMI is is above average; BMI management plan is completed. We discussed portion control and increasing exercise.     Physical Exam  Constitutional:       General: She is not in acute distress.     Appearance: Normal appearance. She is not ill-appearing, toxic-appearing or diaphoretic.   Cardiovascular:      Rate and Rhythm: Normal rate and regular rhythm.      Pulses: Normal pulses.      Heart sounds: Normal heart sounds. No murmur heard.     No friction rub. No gallop.   Neurological:      Mental Status: She is alert.        Diagnoses and all  orders for this visit:    1. Anxiety (Primary)    2. Essential hypertension  -     Comprehensive Metabolic Panel; Future  -     POC Urinalysis Dipstick, Automated; Future  -     POC Urinalysis Dipstick, Automated  -     Comprehensive Metabolic Panel    3. Hyperlipidemia, unspecified hyperlipidemia type  -     Lipid Panel; Future  -     Comprehensive Metabolic Panel; Future  -     Lipid Panel  -     Comprehensive Metabolic Panel    4. Iron deficiency anemia, unspecified iron deficiency anemia type  -     Iron Profile w/o Ferritin; Future  -     Ferritin; Future  -     CBC & Differential; Future  -     Iron Profile w/o Ferritin  -     Ferritin  -     CBC & Differential    5. Vitamin D deficiency  -     Vitamin D,25-Hydroxy; Future  -     Vitamin D,25-Hydroxy    6. Large breasts  -     Ambulatory Referral to Plastic Surgery      Discussed referral to plastic surgery and plan.      Continue on prescribed medication regimen.The patient was instructed to return for follow-up and physical exam in 4 months. The patient was instructed to return sooner if the condition changes, worsens, or does not resolve.       Attending Note:   Patient was personally seen and examined by me.  I have obtained and corroborated the history and examination as documented above, and agree with the accuracy of the documented information.  All orders were reviewed and personally signed by me.   Nic Greer MD  14:44 EDT  06/08/25

## 2025-08-19 DIAGNOSIS — I10 ESSENTIAL HYPERTENSION: ICD-10-CM

## 2025-08-19 RX ORDER — LOSARTAN POTASSIUM AND HYDROCHLOROTHIAZIDE 12.5; 5 MG/1; MG/1
1 TABLET ORAL DAILY
Qty: 90 TABLET | Refills: 0 | Status: SHIPPED | OUTPATIENT
Start: 2025-08-19

## 2025-08-24 DIAGNOSIS — F41.9 ANXIETY: ICD-10-CM

## 2025-08-25 RX ORDER — PROPRANOLOL HYDROCHLORIDE 10 MG/1
10 TABLET ORAL 2 TIMES DAILY
Qty: 180 TABLET | Refills: 1 | Status: SHIPPED | OUTPATIENT
Start: 2025-08-25

## (undated) DEVICE — HOLDER: Brand: DEROYAL

## (undated) DEVICE — CVR HNDL LT SURG ACCSSRY BLU STRL

## (undated) DEVICE — 3.0MM PRECISION NEURO (MATCH HEAD)

## (undated) DEVICE — GLV SURG PREMIERPRO MIC LTX PF SZ8 BRN

## (undated) DEVICE — OIL CARTRIDGE: Brand: CORE, MAESTRO

## (undated) DEVICE — GLV SURG PREMIERPRO MIC LTX PF SZ6 BRN

## (undated) DEVICE — DIFFUSER: Brand: CORE, MAESTRO

## (undated) DEVICE — HDRST INTUB GENTLETOUCH SLOT 7IN RT

## (undated) DEVICE — SKIN AFFIX SURG ADHESIVE 72/CS 0.55ML: Brand: MEDLINE

## (undated) DEVICE — DRSNG WND BORDR/ADHS NONADHR/GZ LF 4X4IN STRL

## (undated) DEVICE — SUT VIC 0 UR6 27IN VCP603H

## (undated) DEVICE — INSTRUMENT 9560702 RADIANCE ILLUMIN SYS: Brand: METRX® SYSTEM

## (undated) DEVICE — 3M™ WARMING BLANKET, UPPER BODY, 10 PER CASE, 42268: Brand: BAIR HUGGER™

## (undated) DEVICE — ANTIBACTERIAL UNDYED BRAIDED (POLYGLACTIN 910), SYNTHETIC ABSORBABLE SUTURE: Brand: COATED VICRYL

## (undated) DEVICE — GLV SURG PREMIERPRO MIC LTX PF SZ6.5 BRN

## (undated) DEVICE — PK MED 10

## (undated) DEVICE — DISPOSABLE IRRIGATION BIPOLAR CORD, M1000 TYPE: Brand: KIRWAN

## (undated) DEVICE — TUBING, SUCTION, 1/4" X 10', STRAIGHT: Brand: MEDLINE

## (undated) DEVICE — GLV SURG PREMIERPRO MIC LTX PF SZ8.5 BRN